# Patient Record
Sex: FEMALE | Race: WHITE | Employment: OTHER | URBAN - METROPOLITAN AREA
[De-identification: names, ages, dates, MRNs, and addresses within clinical notes are randomized per-mention and may not be internally consistent; named-entity substitution may affect disease eponyms.]

---

## 2018-06-19 LAB — MAMMOGRAPHY, EXTERNAL: NORMAL

## 2020-07-29 ENCOUNTER — HOSPITAL ENCOUNTER (OUTPATIENT)
Age: 70
Discharge: HOME | End: 2020-07-31
Payer: MEDICARE

## 2020-07-29 VITALS
WEIGHT: 171 LBS | SYSTOLIC BLOOD PRESSURE: 130 MMHG | RESPIRATION RATE: 14 BRPM | BODY MASS INDEX: 27.6 KG/M2 | DIASTOLIC BLOOD PRESSURE: 78 MMHG | TEMPERATURE: 96.8 F | HEART RATE: 76 BPM

## 2020-07-29 LAB
ALBUMIN SERPL-MCNC: 4.1 G/DL (ref 3.5–5.2)
ALP BLD-CCNC: 55 U/L (ref 35–104)
ALT SERPL-CCNC: 18 U/L (ref 0–32)
ANION GAP SERPL CALCULATED.3IONS-SCNC: 14 MMOL/L (ref 7–16)
AST SERPL-CCNC: 18 U/L (ref 0–31)
BASOPHILS ABSOLUTE: 0.11 E9/L (ref 0–0.2)
BASOPHILS RELATIVE PERCENT: 1.7 % (ref 0–2)
BILIRUB SERPL-MCNC: 0.4 MG/DL (ref 0–1.2)
BUN BLDV-MCNC: 18 MG/DL (ref 8–23)
CALCIUM SERPL-MCNC: 9.4 MG/DL (ref 8.6–10.2)
CHLORIDE BLD-SCNC: 108 MMOL/L (ref 98–107)
CHOLESTEROL, TOTAL: 158 MG/DL (ref 0–199)
CO2: 23 MMOL/L (ref 22–29)
CREAT SERPL-MCNC: 0.7 MG/DL (ref 0.5–1)
EOSINOPHILS ABSOLUTE: 0.2 E9/L (ref 0.05–0.5)
EOSINOPHILS RELATIVE PERCENT: 3.1 % (ref 0–6)
GFR AFRICAN AMERICAN: >60
GFR NON-AFRICAN AMERICAN: >60 ML/MIN/1.73
GLUCOSE BLD-MCNC: 87 MG/DL (ref 74–99)
HCT VFR BLD CALC: 38.8 % (ref 34–48)
HDLC SERPL-MCNC: 67 MG/DL
HEMOGLOBIN: 12.2 G/DL (ref 11.5–15.5)
IMMATURE GRANULOCYTES #: 0.02 E9/L
IMMATURE GRANULOCYTES %: 0.3 % (ref 0–5)
LDL CHOLESTEROL CALCULATED: 78 MG/DL (ref 0–99)
LYMPHOCYTES ABSOLUTE: 1.96 E9/L (ref 1.5–4)
LYMPHOCYTES RELATIVE PERCENT: 30.1 % (ref 20–42)
MCH RBC QN AUTO: 32.5 PG (ref 26–35)
MCHC RBC AUTO-ENTMCNC: 31.4 % (ref 32–34.5)
MCV RBC AUTO: 103.5 FL (ref 80–99.9)
MONOCYTES ABSOLUTE: 0.56 E9/L (ref 0.1–0.95)
MONOCYTES RELATIVE PERCENT: 8.6 % (ref 2–12)
NEUTROPHILS ABSOLUTE: 3.67 E9/L (ref 1.8–7.3)
NEUTROPHILS RELATIVE PERCENT: 56.2 % (ref 43–80)
PDW BLD-RTO: 12.8 FL (ref 11.5–15)
PLATELET # BLD: 269 E9/L (ref 130–450)
PMV BLD AUTO: 10.6 FL (ref 7–12)
POTASSIUM SERPL-SCNC: 4.4 MMOL/L (ref 3.5–5)
RBC # BLD: 3.75 E12/L (ref 3.5–5.5)
SODIUM BLD-SCNC: 145 MMOL/L (ref 132–146)
T3 FREE: 2 PG/ML (ref 2–4.4)
T4 FREE: 1.14 NG/DL (ref 0.93–1.7)
TOTAL PROTEIN: 6.9 G/DL (ref 6.4–8.3)
TRIGL SERPL-MCNC: 67 MG/DL (ref 0–149)
TSH SERPL DL<=0.05 MIU/L-ACNC: 7.44 UIU/ML (ref 0.27–4.2)
VITAMIN D 25-HYDROXY: 64 NG/ML (ref 30–100)
VLDLC SERPL CALC-MCNC: 13 MG/DL
WBC # BLD: 6.5 E9/L (ref 4.5–11.5)

## 2020-07-29 PROCEDURE — 36415 COLL VENOUS BLD VENIPUNCTURE: CPT

## 2020-07-29 PROCEDURE — 80053 COMPREHEN METABOLIC PANEL: CPT

## 2020-07-29 PROCEDURE — 84481 FREE ASSAY (FT-3): CPT

## 2020-07-29 PROCEDURE — 85025 COMPLETE CBC W/AUTO DIFF WBC: CPT

## 2020-07-29 PROCEDURE — 84443 ASSAY THYROID STIM HORMONE: CPT

## 2020-07-29 PROCEDURE — 82306 VITAMIN D 25 HYDROXY: CPT

## 2020-07-29 PROCEDURE — 84439 ASSAY OF FREE THYROXINE: CPT

## 2020-07-29 PROCEDURE — 86800 THYROGLOBULIN ANTIBODY: CPT

## 2020-07-29 PROCEDURE — 80061 LIPID PANEL: CPT

## 2020-07-29 PROCEDURE — 86376 MICROSOMAL ANTIBODY EACH: CPT

## 2020-07-29 RX ORDER — MIRTAZAPINE 15 MG/1
15 TABLET, FILM COATED ORAL NIGHTLY
COMMUNITY
End: 2021-07-30

## 2020-07-29 RX ORDER — PRAVASTATIN SODIUM 20 MG
20 TABLET ORAL DAILY
COMMUNITY
End: 2021-01-29

## 2020-07-29 RX ORDER — LEVOTHYROXINE SODIUM 0.1 MG/1
100 TABLET ORAL DAILY
COMMUNITY

## 2020-07-31 LAB
THYROGLOBULIN AB: >2200 IU/ML (ref 0–4)
THYROID PEROXIDASE (TPO) ABS: 1133 IU/ML (ref 0–9)

## 2020-12-22 RX ORDER — LOSARTAN POTASSIUM 50 MG/1
50 TABLET ORAL DAILY
Qty: 90 TABLET | Refills: 0 | Status: SHIPPED
Start: 2020-12-22 | End: 2021-01-29 | Stop reason: SDUPTHER

## 2021-01-29 ENCOUNTER — OFFICE VISIT (OUTPATIENT)
Dept: FAMILY MEDICINE CLINIC | Age: 71
End: 2021-01-29
Payer: MEDICARE

## 2021-01-29 VITALS
HEART RATE: 73 BPM | SYSTOLIC BLOOD PRESSURE: 128 MMHG | BODY MASS INDEX: 29.16 KG/M2 | WEIGHT: 175 LBS | HEIGHT: 65 IN | OXYGEN SATURATION: 97 % | DIASTOLIC BLOOD PRESSURE: 80 MMHG | TEMPERATURE: 98 F

## 2021-01-29 DIAGNOSIS — F32.A DEPRESSION, UNSPECIFIED DEPRESSION TYPE: ICD-10-CM

## 2021-01-29 DIAGNOSIS — Z00.00 ANNUAL PHYSICAL EXAM: Primary | ICD-10-CM

## 2021-01-29 DIAGNOSIS — R73.9 HYPERGLYCEMIA: ICD-10-CM

## 2021-01-29 DIAGNOSIS — E78.5 HYPERLIPIDEMIA, UNSPECIFIED HYPERLIPIDEMIA TYPE: ICD-10-CM

## 2021-01-29 DIAGNOSIS — K21.9 GASTROESOPHAGEAL REFLUX DISEASE WITHOUT ESOPHAGITIS: ICD-10-CM

## 2021-01-29 DIAGNOSIS — Z85.3 HISTORY OF BREAST CANCER: ICD-10-CM

## 2021-01-29 DIAGNOSIS — F41.1 GENERALIZED ANXIETY DISORDER: ICD-10-CM

## 2021-01-29 DIAGNOSIS — I10 ESSENTIAL HYPERTENSION: ICD-10-CM

## 2021-01-29 DIAGNOSIS — E03.9 HYPOTHYROIDISM, UNSPECIFIED TYPE: ICD-10-CM

## 2021-01-29 LAB
BILIRUBIN, POC: NORMAL
BLOOD URINE, POC: NORMAL
CLARITY, POC: NORMAL
COLOR, POC: NORMAL
GLUCOSE URINE, POC: NORMAL
KETONES, POC: NORMAL
LEUKOCYTE EST, POC: NORMAL
NITRITE, POC: NORMAL
PH, POC: 6
PROTEIN, POC: NORMAL
SPECIFIC GRAVITY, POC: 1
UROBILINOGEN, POC: 0.2

## 2021-01-29 PROCEDURE — 81002 URINALYSIS NONAUTO W/O SCOPE: CPT | Performed by: INTERNAL MEDICINE

## 2021-01-29 PROCEDURE — G0439 PPPS, SUBSEQ VISIT: HCPCS | Performed by: INTERNAL MEDICINE

## 2021-01-29 PROCEDURE — 93000 ELECTROCARDIOGRAM COMPLETE: CPT | Performed by: INTERNAL MEDICINE

## 2021-01-29 PROCEDURE — G8484 FLU IMMUNIZE NO ADMIN: HCPCS | Performed by: INTERNAL MEDICINE

## 2021-01-29 RX ORDER — LOSARTAN POTASSIUM 50 MG/1
50 TABLET ORAL DAILY
Qty: 90 TABLET | Refills: 1 | Status: SHIPPED
Start: 2021-01-29 | End: 2021-07-30 | Stop reason: SDUPTHER

## 2021-01-29 SDOH — ECONOMIC STABILITY: INCOME INSECURITY: HOW HARD IS IT FOR YOU TO PAY FOR THE VERY BASICS LIKE FOOD, HOUSING, MEDICAL CARE, AND HEATING?: NOT ASKED

## 2021-01-29 SDOH — ECONOMIC STABILITY: TRANSPORTATION INSECURITY
IN THE PAST 12 MONTHS, HAS THE LACK OF TRANSPORTATION KEPT YOU FROM MEDICAL APPOINTMENTS OR FROM GETTING MEDICATIONS?: NO

## 2021-01-29 SDOH — ECONOMIC STABILITY: FOOD INSECURITY: WITHIN THE PAST 12 MONTHS, YOU WORRIED THAT YOUR FOOD WOULD RUN OUT BEFORE YOU GOT MONEY TO BUY MORE.: NEVER TRUE

## 2021-01-29 ASSESSMENT — PATIENT HEALTH QUESTIONNAIRE - PHQ9
SUM OF ALL RESPONSES TO PHQ QUESTIONS 1-9: 0
SUM OF ALL RESPONSES TO PHQ QUESTIONS 1-9: 0
SUM OF ALL RESPONSES TO PHQ9 QUESTIONS 1 & 2: 0

## 2021-01-29 NOTE — PROGRESS NOTES
Subjective:     Chief Complaint   Patient presents with    Annual Exam   Patient here for follow-up on hypertension minimal CAD by cardiac cath in 2004 LAD 30 to 40%, stress test 2019 with Dr. Siddiqui Hands negative,  History of abnormal result was on anticoagulation for 6 months seen by Dr. Eustacio Saint has been off the anticoagulation since 2019  Hypothyroidism follows with endocrinologist in [facility] [place] adjusting her thyroid medication  Her stool cards have been done by gynecologist Dr. Lisa Edwards    She had depression and generalized anxiety for long time has done well with the psychiatrist  And feels much more comfortable    History of breast cancer she gets mammogram as per gynecologist Dr. Lisa Edwards  For embolism in 2018 Eliquis she was seen by Dr. Eustacio Saint and Dr. Celis Carrier was stopped as per oncology 6 months  No further events she is physically very active she does the treadmill 4 miles a day every day  She had a CTA chest 2019 which was negative    Past Medical History:   Diagnosis Date    Anxiety     CAD (coronary artery disease)     Cancer ([facility] [address]     Congenital hearing loss     Depression     seeing Dr. Chet Perez. doing better     Hyperlipidemia     Hypertension     Hypothyroidism     Osteopenia     Rosacea     Thyroid disease         Social History     Socioeconomic History    Marital status:      Spouse name: Not on file    Number of children: Not on file    Years of education: Not on file    Highest education level: Not on file   Occupational History    Not on file   Social Needs    Financial resource strain: Not on file    Food insecurity     Worry: Never true     Inability: Never true   [employer] needs     Medical: No     Non-medical: No   Tobacco Use    Smoking status: Never Smoker    Smokeless tobacco: Never Used   Substance and Sexual Activity    Alcohol use:  Yes     Alcohol/week: 14.0 standard drinks     Types: 14 Glasses of wine per week    Drug use: Not on file  Sexual activity: Yes     Partners: Male   Lifestyle    Physical activity     Days per week: Not on file     Minutes per session: Not on file    Stress: Not on file   Relationships    Social connections     Talks on phone: Not on file     Gets together: Not on file     Attends Anabaptist service: Not on file     Active member of club or organization: Not on file     Attends meetings of clubs or organizations: Not on file     Relationship status: Not on file    Intimate partner violence     Fear of current or ex partner: Not on file     Emotionally abused: Not on file     Physically abused: Not on file     Forced sexual activity: Not on file   Other Topics Concern    Not on file   Social History Narrative    Not on file        Past Surgical History:   Procedure Laterality Date    [address] Mastectomy    CARDIAC SURGERY      COLONOSCOPY      ENDOSCOPY, COLON, DIAGNOSTIC      TONSILLECTOMY      TUBAL LIGATION  1982        Family History   Problem Relation Age of Onset    Colon Cancer Mother     Cancer Sister     Cancer Father 80        GI cancer, lip cancer         Allergies   Allergen Reactions    Sulfa Antibiotics      Other reaction(s): Adverse drug reaction, Unknown    Celexa [Citalopram]      GI issues         Lipitor [Atorvastatin Calcium]      Leg pains           ROS  No acute distress  Cardiac: Denies any chest pain or palpitation  She does treadmill 4 miles a day every day without any angina or dyspnea  Respiratory: Denies any cough or shortness of breath  GI: No abdominal pain.  Denies any nausea vomiting or diarrhea  Colonoscopy 2019 by Dr. Marcy Silverman repeat in 24  : Denies any dysuria frequency or hematuria  Neuro: No headache or dizziness  Endocrine: No diabetes history of thyroid disease being followed by endocrinologist in [place]  Skin: normal  No recent weight gain or weight loss  Denies any change in vision    Objective: /80   Pulse 73   Temp 98 °F (36.7 °C)   Ht 5' 5\" (1.651 m)   Wt 175 lb (79.4 kg)   SpO2 97%   BMI 29.12 kg/m²     Constitutional: Alert awake and oriented  Eyes: Pupils equal bilaterally. Extraocular muscles intact  Neck: no JVD adenopathy no bruit  Heart:  RRR, no murmurs, gallops, or rubs. Lungs:    no wheeze, rales or rhonchi  Abd: bowel sounds present, nontender, nondistended, no masses  Extrem:  No clubbing, cyanosis, or edema  Neuro: AAOx3,No Focal deficit  Psychological: Depression anxiety doing much better being followed by psychiatrist every 3 months      Current Outpatient Medications   Medication Sig Dispense Refill    losartan (COZAAR) 50 MG tablet Take 1 tablet by mouth daily 90 tablet 1    levothyroxine (SYNTHROID) 100 MCG tablet Take 100 mcg by mouth Daily       mirtazapine (REMERON) 15 MG tablet Take 15 mg by mouth nightly Take 1/2 at night      Multiple Vitamins-Minerals (THERAPEUTIC MULTIVITAMIN-MINERALS) tablet Take 1 tablet by mouth daily      aspirin 81 MG tablet Take 81 mg by mouth daily      pravastatin (PRAVACHOL) 40 MG tablet Take 40 mg by mouth daily Take alternating with 20mg      omeprazole (PRILOSEC) 20 MG capsule Take 20 mg by mouth daily.  vitamin E 400 UNIT capsule Take 400 Units by mouth daily. No current facility-administered medications for this visit.          Last 3 BMP  Lab Results   Component Value Date/Time     07/29/2020 09:12 AM     04/24/2017 04:15 PM     09/13/2016 12:10 PM    K 4.4 07/29/2020 09:12 AM    K 3.7 04/24/2017 04:15 PM    K 4.0 09/13/2016 12:10 PM     (H) 07/29/2020 09:12 AM     04/24/2017 04:15 PM     09/13/2016 12:10 PM    CO2 23 07/29/2020 09:12 AM    CO2 23 04/24/2017 04:15 PM    CO2 21 (L) 09/13/2016 12:10 PM    BUN 18 07/29/2020 09:12 AM    BUN 15 04/24/2017 04:15 PM    BUN 22 09/13/2016 12:10 PM    CREATININE 0.7 07/29/2020 09:12 AM    CREATININE 0.6 04/24/2017 04:15 PM CREATININE 0.6 09/13/2016 12:10 PM    GLUCOSE 87 07/29/2020 09:12 AM    GLUCOSE 151 (H) 04/24/2017 04:15 PM    GLUCOSE 104 09/13/2016 12:10 PM    GLUCOSE 98 11/09/2011 08:50 AM    GLUCOSE 99 03/07/2011 08:41 AM    GLUCOSE 100 02/23/2011 04:45 PM    CALCIUM 9.4 07/29/2020 09:12 AM    CALCIUM 9.2 04/24/2017 04:15 PM    CALCIUM 9.4 09/13/2016 12:10 PM       Last 3 CMP:    Lab Results   Component Value Date/Time     07/29/2020 09:12 AM     04/24/2017 04:15 PM     09/13/2016 12:10 PM    K 4.4 07/29/2020 09:12 AM    K 3.7 04/24/2017 04:15 PM    K 4.0 09/13/2016 12:10 PM     (H) 07/29/2020 09:12 AM     04/24/2017 04:15 PM     09/13/2016 12:10 PM    CO2 23 07/29/2020 09:12 AM    CO2 23 04/24/2017 04:15 PM    CO2 21 (L) 09/13/2016 12:10 PM    BUN 18 07/29/2020 09:12 AM    BUN 15 04/24/2017 04:15 PM    BUN 22 09/13/2016 12:10 PM    CREATININE 0.7 07/29/2020 09:12 AM    CREATININE 0.6 04/24/2017 04:15 PM    CREATININE 0.6 09/13/2016 12:10 PM    GLUCOSE 87 07/29/2020 09:12 AM    GLUCOSE 151 (H) 04/24/2017 04:15 PM    GLUCOSE 104 09/13/2016 12:10 PM    GLUCOSE 98 11/09/2011 08:50 AM    GLUCOSE 99 03/07/2011 08:41 AM    GLUCOSE 100 02/23/2011 04:45 PM    CALCIUM 9.4 07/29/2020 09:12 AM    CALCIUM 9.2 04/24/2017 04:15 PM    CALCIUM 9.4 09/13/2016 12:10 PM    PROT 6.9 07/29/2020 09:12 AM    PROT 6.8 04/24/2017 04:15 PM    PROT 7.2 04/26/2013 08:45 AM    LABALBU 4.1 07/29/2020 09:12 AM    LABALBU 3.9 04/24/2017 04:15 PM    LABALBU 4.5 04/26/2013 08:45 AM    LABALBU 4.5 11/09/2011 08:50 AM    LABALBU 4.5 03/07/2011 08:41 AM    BILITOT 0.4 07/29/2020 09:12 AM    BILITOT 0.2 04/24/2017 04:15 PM    BILITOT 0.6 04/26/2013 08:45 AM    ALKPHOS 55 07/29/2020 09:12 AM    ALKPHOS 35 04/24/2017 04:15 PM    ALKPHOS 54 04/26/2013 08:45 AM    AST 18 07/29/2020 09:12 AM    AST 16 04/24/2017 04:15 PM    AST 13 04/26/2013 08:45 AM    ALT 18 07/29/2020 09:12 AM    ALT 16 04/24/2017 04:15 PM ALT 22 04/26/2013 08:45 AM        CBC:   Lab Results   Component Value Date/Time    WBC 6.5 07/29/2020 09:12 AM    RBC 3.75 07/29/2020 09:12 AM    HGB 12.2 07/29/2020 09:12 AM    HCT 38.8 07/29/2020 09:12 AM    .5 (H) 07/29/2020 09:12 AM    MCH 32.5 07/29/2020 09:12 AM    MCHC 31.4 (L) 07/29/2020 09:12 AM    RDW 12.8 07/29/2020 09:12 AM     07/29/2020 09:12 AM    MPV 10.6 07/29/2020 09:12 AM       A1C:  No results found for: LABA1C    Lipid panel:  Lab Results   Component Value Date    CHOL 158 07/29/2020    CHOL 196 04/26/2013    CHOL 155 11/09/2011    CHOL 171 03/07/2011    TRIG 67 07/29/2020    TRIG 84 04/26/2013    TRIG 90 11/09/2011    HDL 67 07/29/2020    HDL 68.0 04/26/2013    HDL 56.0 11/09/2011        Lab Results   Component Value Date/Time    PROT 6.9 07/29/2020 09:12 AM    PROT 6.8 04/24/2017 04:15 PM    PROT 7.2 04/26/2013 08:45 AM    INR 1.1 02/23/2011 04:45 PM       Lab Results   Component Value Date/Time    MG 2.1 02/23/2011 04:45 PM         Assessment. Nella was seen today for annual exam.    Diagnoses and all orders for this visit:    Annual physical exam  -     EKG 12 Lead; Future  -     POCT Urinalysis no Micro  -     EKG 12 Lead    Essential hypertension  -     CBC Auto Differential; Future  -     Comprehensive Metabolic Panel; Future    Hypothyroidism, unspecified type    Gastroesophageal reflux disease without esophagitis  -     CBC Auto Differential; Future    Hyperlipidemia, unspecified hyperlipidemia type  -     Comprehensive Metabolic Panel; Future  -     Lipid Panel; Future    Depression, unspecified depression type    Generalized anxiety disorder    History of breast cancer    Hyperglycemia  -     Comprehensive Metabolic Panel; Future  -     Hemoglobin A1C; Future    Other orders  -     losartan (COZAAR) 50 MG tablet; Take 1 tablet by mouth daily       There is no problem list on file for this patient.       Plan: Overall patient is doing very well Asymptomatic no new symptoms    Continue losartan for the blood pressure control    She is taking Synthroid 100 mcg 5 days a week and 150 mcg 2 days a week    She had a thyroid nodule in the past I recommended ultrasound thyroid she will discuss with the endocrinologist in [place]    She gets the stool checked by Dr. Mary Lopez    She sees a physician with a functional medicine in [place] and taking supplements they checked her stool    Continue diet and exercise    EKG shows normal sinus rhythm nonspecific T wave changes  She will follow-up with Dr. Michael Castillo this year  She had minimal CAD LAD 30 to 40% 2004 lifestyle diet modification discussed regular exercise discussed    She gets mammogram as per gynecologist    Repeat blood work before next visit    Return in about 6 months (around 7/29/2021).        Jared Nguyen MD  10:59 AM  1/29/2021     DE

## 2021-02-14 ENCOUNTER — IMMUNIZATION (OUTPATIENT)
Dept: PRIMARY CARE CLINIC | Age: 71
End: 2021-02-14
Payer: MEDICARE

## 2021-02-14 PROCEDURE — 0001A COVID-19, PFIZER VACCINE 30MCG/0.3ML DOSE: CPT | Performed by: PHYSICIAN ASSISTANT

## 2021-02-14 PROCEDURE — 91300 COVID-19, PFIZER VACCINE 30MCG/0.3ML DOSE: CPT | Performed by: PHYSICIAN ASSISTANT

## 2021-03-10 ENCOUNTER — IMMUNIZATION (OUTPATIENT)
Dept: PRIMARY CARE CLINIC | Age: 71
End: 2021-03-10
Payer: MEDICARE

## 2021-03-10 PROCEDURE — 0002A COVID-19, PFIZER VACCINE 30MCG/0.3ML DOSE: CPT | Performed by: NURSE PRACTITIONER

## 2021-03-10 PROCEDURE — 91300 COVID-19, PFIZER VACCINE 30MCG/0.3ML DOSE: CPT | Performed by: NURSE PRACTITIONER

## 2021-05-16 ENCOUNTER — HOSPITAL ENCOUNTER (EMERGENCY)
Age: 71
Discharge: HOME | End: 2021-05-16
Attending: EMERGENCY MEDICINE
Payer: MEDICARE

## 2021-05-16 ENCOUNTER — APPOINTMENT (OUTPATIENT)
Dept: GENERAL RADIOLOGY | Age: 71
End: 2021-05-16
Payer: MEDICARE

## 2021-05-16 VITALS
HEIGHT: 67 IN | DIASTOLIC BLOOD PRESSURE: 82 MMHG | HEART RATE: 67 BPM | RESPIRATION RATE: 16 BRPM | SYSTOLIC BLOOD PRESSURE: 136 MMHG | BODY MASS INDEX: 26.37 KG/M2 | TEMPERATURE: 97.7 F | OXYGEN SATURATION: 98 % | WEIGHT: 168 LBS

## 2021-05-16 DIAGNOSIS — S52.592A OTHER CLOSED FRACTURE OF DISTAL END OF LEFT RADIUS, INITIAL ENCOUNTER: Primary | ICD-10-CM

## 2021-05-16 PROCEDURE — 73100 X-RAY EXAM OF WRIST: CPT

## 2021-05-16 PROCEDURE — 73110 X-RAY EXAM OF WRIST: CPT

## 2021-05-16 PROCEDURE — 99284 EMERGENCY DEPT VISIT MOD MDM: CPT

## 2021-05-16 PROCEDURE — 25605 CLTX DST RDL FX/EPHYS SEP W/: CPT

## 2021-05-16 RX ORDER — ACETAMINOPHEN 500 MG
500 TABLET ORAL 4 TIMES DAILY PRN
Qty: 120 TABLET | Refills: 0 | Status: SHIPPED | OUTPATIENT
Start: 2021-05-16 | End: 2021-07-30

## 2021-05-16 ASSESSMENT — ENCOUNTER SYMPTOMS
CHEST TIGHTNESS: 0
ABDOMINAL PAIN: 0
COUGH: 0
DIARRHEA: 0
VOMITING: 0
NAUSEA: 0
BACK PAIN: 0
SORE THROAT: 0
ABDOMINAL DISTENTION: 0
SHORTNESS OF BREATH: 0

## 2021-05-16 NOTE — ED PROVIDER NOTES
HPI     Patient is a 78 y.o. femalewith a past medical history of [place] artery disease, cancer, depression, hyperlipidemia, hypertension, hypothyroidism, and rosacea who presents with a chief complaint of fall. This has been occurring for few hours. Patient states that it gets better with nothing. Patient states that it gets worse with nothing. Patient states that it is severe in severity. Patient states that she fell while picking up a brick and landed on her left wrist that was outstretched. Patient states that she now has a deformity. Patient denies hitting her head. Patient denies any loss of consciousness. Patient takes blood thinners. Patient states that she is able to move the fingers and has mild pain to left hand. Patient denies any other systemic symptoms or symptoms leading prior to the fall. Patient denies any fevers, chills, nausea, vomiting, or change in urination or bowel habits. Patient is right-handed  Review of Systems   Constitutional: Negative for activity change, appetite change, chills, fatigue and fever. HENT: Negative for congestion, drooling and sore throat. Respiratory: Negative for cough, chest tightness and shortness of breath. Cardiovascular: Negative for chest pain and palpitations. Gastrointestinal: Negative for abdominal distention, abdominal pain, diarrhea, nausea and vomiting. Genitourinary: Negative for decreased urine volume, difficulty urinating, enuresis, flank pain, frequency and hematuria. Musculoskeletal: Positive for joint swelling and myalgias. Negative for arthralgias, back pain and neck stiffness. Skin: Negative for rash and wound. Neurological: Negative for dizziness, facial asymmetry, light-headedness and headaches. Psychiatric/Behavioral: Negative for agitation, confusion and decreased concentration. Physical Exam  Vitals and nursing note reviewed. Constitutional:       General: She is not in acute distress. Appearance: She is well-developed. She is not ill-appearing. HENT:      Head: Normocephalic and atraumatic. Right Ear: External ear normal.      Left Ear: External ear normal.      Nose: Nose normal. No congestion or rhinorrhea. Mouth/Throat:      Mouth: Mucous membranes are moist.      Pharynx: No oropharyngeal exudate or posterior oropharyngeal erythema. Eyes:      Conjunctiva/sclera: Conjunctivae normal.   Cardiovascular:      Rate and Rhythm: Normal rate and regular rhythm. Heart sounds: Normal heart sounds. No murmur heard. Pulmonary:      Effort: Pulmonary effort is normal. No respiratory distress. Breath sounds: Normal breath sounds. No wheezing or rales. Abdominal:      General: Bowel sounds are normal.      Palpations: Abdomen is soft. Tenderness: There is no abdominal tenderness. There is no guarding or rebound. Musculoskeletal:         General: Tenderness, deformity and signs of injury present. No swelling. Cervical back: Normal range of motion and neck supple. Right lower leg: No edema. Comments: Patient has a dinner fork deformity left hand. Patient is able to move all 5 fingers left hand. Patient has good cap refill. Patient sensation intact. Patient's tenderness to palpation. Skin:     General: Skin is warm and dry. Neurological:      Mental Status: She is alert and oriented to person, place, and time. Cranial Nerves: No cranial nerve deficit.       Coordination: Coordination normal.          Splint Application    Date/Time: 5/16/2021 3:24 PM  Performed by: Maria Del Carmen Leary MD  Authorized by: Mariely Soliman DO     Consent:     Consent obtained:  Verbal    Consent given by:  Patient    Risks discussed:  Discoloration, numbness, pain and swelling    Alternatives discussed:  No treatment  Pre-procedure details:     Sensation:  Weakness  Procedure details:     Laterality:  Left    Location:  Wrist    Wrist:  L wrist    Splint type:  Sugar tong    Supplies:  Ortho-Glass  Post-procedure details:     Pain:  Improved    Sensation:  Weakness    Patient tolerance of procedure: Tolerated well, no immediate complications  Ortho Injury    Date/Time: 5/16/2021 3:25 PM  Performed by: Magdalena Parker MD  Authorized by: Linnette Paulino DO   Injury location: wrist  Location details: left wrist  Injury type: fracture  Fracture type: distal radius  Pre-procedure distal perfusion: normal  Pre-procedure neurological function: normal  Pre-procedure range of motion: reduced  Anesthesia: local infiltration    Anesthesia:  Local anesthesia used: yes  Local Anesthetic: lidocaine 1% without epinephrine  Anesthetic total: 5 mL    Sedation:  Patient sedated: no    Manipulation performed: yes  Skin traction used: yes  Skeletal traction used: yes  Reduction successful: yes  X-ray confirmed reduction: yes  Immobilization: sling  Splint type: sugar tong  Supplies used: Ortho-Glass  Post-procedure distal perfusion: normal  Post-procedure neurological function: normal  Post-procedure range of motion: improved           [facility]     ED Course as of May 18 1209   Sun May 16, 2021   1458 Attempted wrist reduction with some mild success. Patient was placed in an sugar tong. [JM]      ED Course User Index  [JM] Magdalena Parker MD      Patient is a 78 y.o. female presenting with wrist pain. Patient had a mechanical fall landing on her outstretched left wrist.  Patient has no pain anywhere else. Patient has obvious wrist deformity. Patient will have x-rays done. X-rays indicated distal radius fracture. Fracture was reduced using a hematoma block. Fracture appears to be improved and discussed case with orthopedic surgery who stated that patient can follow-up tomorrow. Patient was educated these findings. Patient declined pain medication while in the ED. Patient good pulses and good sensation in the left wrist after reduction. Patient was given return precautions.  Patient will follow up with their primary care provider. Patient is agreeable to this plan. Patient has remained stable throughout their stay in the ED. Patient was seen and evaluated by myself and my attending No att. providers found. Assessment and Plan discussed with attending provider, please see attestation for final plan of care. This note was done using dictation software and there may be some grammatical errors associated with this. Nallely Valiente DO       ED Course as of May 18 1209   Sun May 16, 2021   1458 Attempted wrist reduction with some mild success. Patient was placed in an sugar tong. []      ED Course User Index  [] Samy Figueroa MD       --------------------------------------------- PAST HISTORY ---------------------------------------------  Past Medical History:  has a past medical history of Anxiety, CAD (coronary artery disease), Cancer ([facility] [address] Congenital hearing loss, Depression, Hyperlipidemia, Hypertension, Hypothyroidism, Osteopenia, Rosacea, and Thyroid disease. Past Surgical History:  has a past surgical history that includes Cardiac surgery; Breast surgery; Tubal ligation (1982); Tonsillectomy; Colonoscopy; and Endoscopy, colon, diagnostic. Social History:  reports that she has never smoked. She has never used smokeless tobacco. She reports current alcohol use of about 14.0 standard drinks of alcohol per week. Family History: family history includes Cancer in her sister; Cancer (age of onset: 80) in her father; Colon Cancer in her mother. The patients home medications have been reviewed. Allergies: Sulfa antibiotics, Celexa [citalopram], and Lipitor [atorvastatin calcium]    -------------------------------------------------- RESULTS -------------------------------------------------  Labs:  No results found for this visit on 05/16/21. Radiology:  XR WRIST LEFT (2 VIEWS)   Final Result   1. Angulated distal left radius fracture apex volar.   Regional soft tissue   swelling. Post reduction radiographs also obtained which showed improved   overall alignment. 2.  Probable ulnar styloid process fracture. 3.  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 XR WRIST LEFT (MIN 3 VIEWS)   Final Result   1. Angulated distal left radius fracture apex volar. Regional soft tissue   swelling. Post reduction radiographs also obtained which showed improved   overall alignment. 2.  Probable ulnar styloid process fracture. 3.  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             ------------------------- NURSING NOTES AND VITALS REVIEWED ---------------------------  Date / Time Roomed:  5/16/2021  1:23 PM  ED Bed Assignment:  05/05    The nursing notes within the ED encounter and vital signs as below have been reviewed. /82   Pulse 67   Temp 97.7 °F (36.5 °C)   Resp 16   Ht 5' 7\" (1.702 m)   Wt 168 lb (76.2 kg)   SpO2 98%   BMI 26.31 kg/m²   Oxygen Saturation Interpretation: Normal      ------------------------------------------ PROGRESS NOTES ------------------------------------------  3:26 PM EDT  I have spoken with the patient and discussed todays results, in addition to providing specific details for the plan of care and counseling regarding the diagnosis and prognosis. Their questions are answered at this time and they are agreeable with the plan. I discussed at length with them reasons for immediate return here for re evaluation. They will followup with their orthopedic physician by calling their office tomorrow. --------------------------------- ADDITIONAL PROVIDER NOTES ---------------------------------  At this time the patient is without objective evidence of an acute process requiring hospitalization or inpatient management. They have remained hemodynamically stable throughout their entire ED visit and are stable for discharge with outpatient follow-up.      The plan has been discussed in detail and they are aware of the specific conditions for emergent return, as well as the importance of follow-up. Discharge Medication List as of 5/16/2021  3:14 PM      START taking these medications    Details   acetaminophen (TYLENOL) 500 MG tablet Take 1 tablet by mouth 4 times daily as needed for Pain, Disp-120 tablet, R-0Normal             Diagnosis:  1. Other closed fracture of distal end of left radius, initial encounter        Disposition:  Patient's disposition: Discharge to home  Patient's condition is stable. ATTENDING PROVIDER ATTESTATION:     Joe Garcia presented to the emergency department for evaluation of Fall and Wrist Injury (lt wrist deformity)    I have reviewed and discussed the case, including pertinent history (medical, surgical, family and social) and exam findings with the Resident and the Nurse assigned to Joe Garcia. I have personally performed and/or participated in the history, exam, medical decision making, and procedures and agree with all pertinent clinical information. Obvious deformity of left wrist with dorsal angulation. Sensation grossly intact in left hand. Cap refill is brisk. Right radial pulses 2+. No other areas of bony tenderness. No midline cervical spine tenderness. No bony crepitance. I have reviewed my findings and recommendations with Joe Garcia and members of family present at the time of disposition. MDM: Supportive care, will obtain appropriate labs and imaging to assess patient's Fall and Wrist Injury (lt wrist deformity)       My findings/plan: The encounter diagnosis was Other closed fracture of distal end of left radius, initial encounter.   Discharge Medication List as of 5/16/2021  3:14 PM        START taking these medications    Details   acetaminophen (TYLENOL) 500 MG tablet Take 1 tablet by mouth 4 times daily as needed for Pain, Disp-120 tablet, R-0Normal           DO Luis M West Valerie Mercado MD  Resident  05/16/21 [id number]  [id number], DO  05/18/21 0117

## 2021-07-26 DIAGNOSIS — E78.5 HYPERLIPIDEMIA, UNSPECIFIED HYPERLIPIDEMIA TYPE: ICD-10-CM

## 2021-07-26 DIAGNOSIS — R73.9 HYPERGLYCEMIA: ICD-10-CM

## 2021-07-26 DIAGNOSIS — K21.9 GASTROESOPHAGEAL REFLUX DISEASE WITHOUT ESOPHAGITIS: ICD-10-CM

## 2021-07-26 DIAGNOSIS — I10 ESSENTIAL HYPERTENSION: ICD-10-CM

## 2021-07-26 LAB
ALBUMIN SERPL-MCNC: 4 G/DL (ref 3.5–5.2)
ALP BLD-CCNC: 57 U/L (ref 35–104)
ALT SERPL-CCNC: 10 U/L (ref 0–32)
ANION GAP SERPL CALCULATED.3IONS-SCNC: 14 MMOL/L (ref 7–16)
AST SERPL-CCNC: 20 U/L (ref 0–31)
BASOPHILS ABSOLUTE: 0.08 E9/L (ref 0–0.2)
BASOPHILS RELATIVE PERCENT: 1.1 % (ref 0–2)
BILIRUB SERPL-MCNC: 0.3 MG/DL (ref 0–1.2)
BUN BLDV-MCNC: 18 MG/DL (ref 6–23)
CALCIUM SERPL-MCNC: 9.8 MG/DL (ref 8.6–10.2)
CHLORIDE BLD-SCNC: 108 MMOL/L (ref 98–107)
CHOLESTEROL, TOTAL: 178 MG/DL (ref 0–199)
CO2: 22 MMOL/L (ref 22–29)
CREAT SERPL-MCNC: 0.7 MG/DL (ref 0.5–1)
EOSINOPHILS ABSOLUTE: 0.27 E9/L (ref 0.05–0.5)
EOSINOPHILS RELATIVE PERCENT: 3.8 % (ref 0–6)
GFR AFRICAN AMERICAN: >60
GFR NON-AFRICAN AMERICAN: >60 ML/MIN/1.73
GLUCOSE BLD-MCNC: 83 MG/DL (ref 74–99)
HBA1C MFR BLD: 5.3 % (ref 4–5.6)
HCT VFR BLD CALC: 37.9 % (ref 34–48)
HDLC SERPL-MCNC: 66 MG/DL
HEMOGLOBIN: 11.9 G/DL (ref 11.5–15.5)
IMMATURE GRANULOCYTES #: 0.02 E9/L
IMMATURE GRANULOCYTES %: 0.3 % (ref 0–5)
LDL CHOLESTEROL CALCULATED: 100 MG/DL (ref 0–99)
LYMPHOCYTES ABSOLUTE: 2.12 E9/L (ref 1.5–4)
LYMPHOCYTES RELATIVE PERCENT: 29.8 % (ref 20–42)
MCH RBC QN AUTO: 31.8 PG (ref 26–35)
MCHC RBC AUTO-ENTMCNC: 31.4 % (ref 32–34.5)
MCV RBC AUTO: 101.3 FL (ref 80–99.9)
MONOCYTES ABSOLUTE: 0.54 E9/L (ref 0.1–0.95)
MONOCYTES RELATIVE PERCENT: 7.6 % (ref 2–12)
NEUTROPHILS ABSOLUTE: 4.09 E9/L (ref 1.8–7.3)
NEUTROPHILS RELATIVE PERCENT: 57.4 % (ref 43–80)
PDW BLD-RTO: 12.9 FL (ref 11.5–15)
PLATELET # BLD: 289 E9/L (ref 130–450)
PMV BLD AUTO: 10 FL (ref 7–12)
POTASSIUM SERPL-SCNC: 4.6 MMOL/L (ref 3.5–5)
RBC # BLD: 3.74 E12/L (ref 3.5–5.5)
SODIUM BLD-SCNC: 144 MMOL/L (ref 132–146)
TOTAL PROTEIN: 7.1 G/DL (ref 6.4–8.3)
TRIGL SERPL-MCNC: 60 MG/DL (ref 0–149)
VLDLC SERPL CALC-MCNC: 12 MG/DL
WBC # BLD: 7.1 E9/L (ref 4.5–11.5)

## 2021-07-30 ENCOUNTER — OFFICE VISIT (OUTPATIENT)
Dept: FAMILY MEDICINE CLINIC | Age: 71
End: 2021-07-30
Payer: MEDICARE

## 2021-07-30 VITALS
TEMPERATURE: 97 F | WEIGHT: 172 LBS | SYSTOLIC BLOOD PRESSURE: 130 MMHG | DIASTOLIC BLOOD PRESSURE: 80 MMHG | OXYGEN SATURATION: 96 % | HEART RATE: 54 BPM | BODY MASS INDEX: 26.94 KG/M2

## 2021-07-30 DIAGNOSIS — I10 ESSENTIAL HYPERTENSION: Primary | ICD-10-CM

## 2021-07-30 DIAGNOSIS — F41.8 DEPRESSION WITH ANXIETY: ICD-10-CM

## 2021-07-30 DIAGNOSIS — E78.5 HYPERLIPIDEMIA, UNSPECIFIED HYPERLIPIDEMIA TYPE: ICD-10-CM

## 2021-07-30 DIAGNOSIS — I25.10 CORONARY ARTERY DISEASE INVOLVING NATIVE CORONARY ARTERY OF NATIVE HEART WITHOUT ANGINA PECTORIS: ICD-10-CM

## 2021-07-30 DIAGNOSIS — D75.89 MACROCYTOSIS WITHOUT ANEMIA: ICD-10-CM

## 2021-07-30 DIAGNOSIS — Z86.711 HISTORY OF PULMONARY EMBOLISM: ICD-10-CM

## 2021-07-30 DIAGNOSIS — E03.9 HYPOTHYROIDISM, UNSPECIFIED TYPE: ICD-10-CM

## 2021-07-30 PROCEDURE — 3017F COLORECTAL CA SCREEN DOC REV: CPT | Performed by: INTERNAL MEDICINE

## 2021-07-30 PROCEDURE — G8427 DOCREV CUR MEDS BY ELIG CLIN: HCPCS | Performed by: INTERNAL MEDICINE

## 2021-07-30 PROCEDURE — 4040F PNEUMOC VAC/ADMIN/RCVD: CPT | Performed by: INTERNAL MEDICINE

## 2021-07-30 PROCEDURE — 1123F ACP DISCUSS/DSCN MKR DOCD: CPT | Performed by: INTERNAL MEDICINE

## 2021-07-30 PROCEDURE — G8417 CALC BMI ABV UP PARAM F/U: HCPCS | Performed by: INTERNAL MEDICINE

## 2021-07-30 PROCEDURE — 1090F PRES/ABSN URINE INCON ASSESS: CPT | Performed by: INTERNAL MEDICINE

## 2021-07-30 PROCEDURE — 99214 OFFICE O/P EST MOD 30 MIN: CPT | Performed by: INTERNAL MEDICINE

## 2021-07-30 PROCEDURE — G8400 PT W/DXA NO RESULTS DOC: HCPCS | Performed by: INTERNAL MEDICINE

## 2021-07-30 PROCEDURE — 1036F TOBACCO NON-USER: CPT | Performed by: INTERNAL MEDICINE

## 2021-07-30 RX ORDER — PHENOL 1.4 %
1 AEROSOL, SPRAY (ML) MUCOUS MEMBRANE 3 TIMES DAILY PRN
COMMUNITY
End: 2022-03-28

## 2021-07-30 RX ORDER — LOSARTAN POTASSIUM 50 MG/1
50 TABLET ORAL DAILY
Qty: 90 TABLET | Refills: 1 | Status: SHIPPED
Start: 2021-07-30 | End: 2021-12-21 | Stop reason: SDUPTHER

## 2021-07-30 NOTE — PROGRESS NOTES
Subjective:     Chief Complaint   Patient presents with    3 Month Follow-Up   Patient here for follow-up on the hypertension, hyperlipidemia lab reports,  Follow-up on the thyroid,  She does see endocrinologist in [facility],    She recently had a wrist fracture on the left hand after a fall, she was working in the yard she lost her balance and fell she had surgery done in [place] [address]  She is feeling a lot better    She had a mammogram ordered by gynecologist she is going for that      Past Medical History:   Diagnosis Date    Anxiety     CAD (coronary artery disease)     Cancer ([facility] [address]     Congenital hearing loss     Depression     seeing Dr. Chepe Lopez. doing better     Hyperlipidemia     Hypertension     Hypothyroidism     Osteopenia     Rosacea     Thyroid disease         Social History     Socioeconomic History    Marital status:      Spouse name: Not on file    Number of children: Not on file    Years of education: Not on file    Highest education level: Not on file   Occupational History    Not on file   Tobacco Use    Smoking status: Never Smoker    Smokeless tobacco: Never Used   Substance and Sexual Activity    Alcohol use: Yes     Alcohol/week: 14.0 standard drinks     Types: 14 Glasses of wine per week    Drug use: Not on file    Sexual activity: Yes     Partners: Male   Other Topics Concern    Not on file   Social History Narrative    Not on file     Social Determinants of Health     Financial Resource Strain:     Difficulty of Paying Living Expenses:    Food Insecurity: No Food Insecurity    Worried About Running Out of Food in the Last Year: Never true    Geremias of Food in the Last Year: Never true   Transportation Needs: No Transportation Needs    Lack of Transportation (Medical): No    Lack of Transportation (Non-Medical):  No   Physical Activity:     Days of Exercise per Week:     Minutes of Exercise per Session:    Stress:     Feeling of Stress : Social Connections:     Frequency of Communication with Friends and Family:     Frequency of Social Gatherings with Friends and Family:     Attends Latter day Services:     Active Member of Clubs or Organizations:     Attends Club or Organization Meetings:     Marital Status:    Intimate Partner Violence:     Fear of Current or Ex-Partner:     Emotionally Abused:     Physically Abused:     Sexually Abused:         Past Surgical History:   Procedure Laterality Date    BREAST SURGERY      Rt. Mastectomy    CARDIAC SURGERY      COLONOSCOPY      ENDOSCOPY, COLON, DIAGNOSTIC      TONSILLECTOMY      TUBAL LIGATION  1982        Family History   Problem Relation Age of Onset    Colon Cancer Mother     Cancer Sister     Cancer Father 80        GI cancer, lip cancer         Allergies   Allergen Reactions    Sulfa Antibiotics      Other reaction(s): Adverse drug reaction, Unknown    Celexa [Citalopram]      GI issues         Lipitor [Atorvastatin Calcium]      Leg pains           ROS  No acute distress  Cardiac: Denies any chest pain or palpitation  Denies any chest pain palpitation  She does the treadmill at home in the winter without any difficulty  She is very active and walking without any difficulty without any angina    Minimal CAD by cardiac cath in 2004  LAD 30 to 40%, stress test 2019 - with Dr. Leanne Metcalf  Respiratory: Denies any cough or shortness of breath    History of colon embolism in 2018 she was seen by hematologist Dr. Belkis Fofana and pulmonologist Dr. Ryan Arthur  CTA chest 2019 was negative    She has been off Eliquis for long time without any recurrence of symptoms she was seen by oncologist and pulmonologist and they stopped the Eliquis    GI: No abdominal pain.  Denies any nausea vomiting or diarrhea    Colonoscopy 2019 by Dr. Kb Simmons repeat in 2024  : Denies any dysuria frequency or hematuria  Neuro: No headache or dizziness  Endocrine: No diabetes  Hypothyroidism she does see endocrinologist in [facility]  Had a TSH done recently at [facility] which was okay as per patient  Skin: normal  No recent weight gain or weight loss  Denies any change in vision    Objective:    /80   Pulse 54   Temp 97 °F (36.1 °C)   Wt 172 lb (78 kg)   SpO2 96%   BMI 26.94 kg/m²     Constitutional: Alert awake and oriented  Eyes: Pupils equal bilaterally. Extraocular muscles intact  Neck: no JVD adenopathy no bruit  Heart:  RRR, no murmurs, gallops, or rubs. Lungs:    no wheeze, rales or rhonchi  Abd: bowel sounds present, nontender, nondistended, no masses  Extrem:  No clubbing, cyanosis, or edema  Neuro: AAOx3,No Focal deficit  Psychological: History of depression anxiety doing much better she did follow with the psychiatrist, denies any suicidal ideation      Current Outpatient Medications   Medication Sig Dispense Refill    calcium carbonate 600 MG TABS tablet Take 1 tablet by mouth 3 times daily as needed      losartan (COZAAR) 50 MG tablet Take 1 tablet by mouth daily 90 tablet 1    levothyroxine (SYNTHROID) 100 MCG tablet Take 100 mcg by mouth Daily       Multiple Vitamins-Minerals (THERAPEUTIC MULTIVITAMIN-MINERALS) tablet Take 1 tablet by mouth daily      aspirin 81 MG tablet Take 81 mg by mouth daily      pravastatin (PRAVACHOL) 40 MG tablet Take 40 mg by mouth daily Take alternating with 20mg       No current facility-administered medications for this visit.         Last 3 BMP  Lab Results   Component Value Date/Time     07/26/2021 09:09 AM     07/29/2020 09:12 AM     04/24/2017 04:15 PM    K 4.6 07/26/2021 09:09 AM    K 4.4 07/29/2020 09:12 AM    K 3.7 04/24/2017 04:15 PM     (H) 07/26/2021 09:09 AM     (H) 07/29/2020 09:12 AM     04/24/2017 04:15 PM    CO2 22 07/26/2021 09:09 AM    CO2 23 07/29/2020 09:12 AM    CO2 23 04/24/2017 04:15 PM    BUN 18 07/26/2021 09:09 AM    BUN 18 07/29/2020 09:12 AM    BUN 15 04/24/2017 04:15 PM    CREATININE 0.7 07/26/2021 09:09 AM CREATININE 0.7 07/29/2020 09:12 AM    CREATININE 0.6 04/24/2017 04:15 PM    GLUCOSE 83 07/26/2021 09:09 AM    GLUCOSE 87 07/29/2020 09:12 AM    GLUCOSE 151 (H) 04/24/2017 04:15 PM    GLUCOSE 98 11/09/2011 08:50 AM    GLUCOSE 99 03/07/2011 08:41 AM    GLUCOSE 100 02/23/2011 04:45 PM    CALCIUM 9.8 07/26/2021 09:09 AM    CALCIUM 9.4 07/29/2020 09:12 AM    CALCIUM 9.2 04/24/2017 04:15 PM       Last 3 CMP:    Lab Results   Component Value Date/Time     07/26/2021 09:09 AM     07/29/2020 09:12 AM     04/24/2017 04:15 PM    K 4.6 07/26/2021 09:09 AM    K 4.4 07/29/2020 09:12 AM    K 3.7 04/24/2017 04:15 PM     (H) 07/26/2021 09:09 AM     (H) 07/29/2020 09:12 AM     04/24/2017 04:15 PM    CO2 22 07/26/2021 09:09 AM    CO2 23 07/29/2020 09:12 AM    CO2 23 04/24/2017 04:15 PM    BUN 18 07/26/2021 09:09 AM    BUN 18 07/29/2020 09:12 AM    BUN 15 04/24/2017 04:15 PM    CREATININE 0.7 07/26/2021 09:09 AM    CREATININE 0.7 07/29/2020 09:12 AM    CREATININE 0.6 04/24/2017 04:15 PM    GLUCOSE 83 07/26/2021 09:09 AM    GLUCOSE 87 07/29/2020 09:12 AM    GLUCOSE 151 (H) 04/24/2017 04:15 PM    GLUCOSE 98 11/09/2011 08:50 AM    GLUCOSE 99 03/07/2011 08:41 AM    GLUCOSE 100 02/23/2011 04:45 PM    CALCIUM 9.8 07/26/2021 09:09 AM    CALCIUM 9.4 07/29/2020 09:12 AM    CALCIUM 9.2 04/24/2017 04:15 PM    PROT 7.1 07/26/2021 09:09 AM    PROT 6.9 07/29/2020 09:12 AM    PROT 6.8 04/24/2017 04:15 PM    LABALBU 4.0 07/26/2021 09:09 AM    LABALBU 4.1 07/29/2020 09:12 AM    LABALBU 3.9 04/24/2017 04:15 PM    LABALBU 4.5 11/09/2011 08:50 AM    LABALBU 4.5 03/07/2011 08:41 AM    BILITOT 0.3 07/26/2021 09:09 AM    BILITOT 0.4 07/29/2020 09:12 AM    BILITOT 0.2 04/24/2017 04:15 PM    ALKPHOS 57 07/26/2021 09:09 AM    ALKPHOS 55 07/29/2020 09:12 AM    ALKPHOS 35 04/24/2017 04:15 PM    AST 20 07/26/2021 09:09 AM    AST 18 07/29/2020 09:12 AM    AST 16 04/24/2017 04:15 PM    ALT 10 07/26/2021 09:09 AM    ALT 18 07/29/2020 09:12 AM    ALT 16 04/24/2017 04:15 PM        CBC:   Lab Results   Component Value Date/Time    WBC 7.1 07/26/2021 09:09 AM    RBC 3.74 07/26/2021 09:09 AM    HGB 11.9 07/26/2021 09:09 AM    HCT 37.9 07/26/2021 09:09 AM    .3 (H) 07/26/2021 09:09 AM    MCH 31.8 07/26/2021 09:09 AM    MCHC 31.4 (L) 07/26/2021 09:09 AM    RDW 12.9 07/26/2021 09:09 AM     07/26/2021 09:09 AM    MPV 10.0 07/26/2021 09:09 AM       A1C:  Lab Results   Component Value Date/Time    LABA1C 5.3 07/26/2021 09:09 AM       Lipid panel:  Lab Results   Component Value Date    CHOL 178 07/26/2021    CHOL 158 07/29/2020    CHOL 196 04/26/2013    CHOL 155 11/09/2011    CHOL 171 03/07/2011    TRIG 60 07/26/2021    TRIG 67 07/29/2020    TRIG 84 04/26/2013    HDL 66 07/26/2021    HDL 67 07/29/2020    HDL 68.0 04/26/2013        Lab Results   Component Value Date/Time    PROT 7.1 07/26/2021 09:09 AM    PROT 6.9 07/29/2020 09:12 AM    PROT 6.8 04/24/2017 04:15 PM    INR 1.1 02/23/2011 04:45 PM       Lab Results   Component Value Date/Time    MG 2.1 02/23/2011 04:45 PM         Assessment. Nella was seen today for 3 month follow-up. Diagnoses and all orders for this visit:    Essential hypertension    Macrocytosis without anemia  -     CBC WITH AUTO DIFFERENTIAL; Future  -     VITAMIN B12 & FOLATE; Future    Hyperlipidemia, unspecified hyperlipidemia type  -     COMPREHENSIVE METABOLIC PANEL; Future  -     LIPID PANEL; Future    Hypothyroidism, unspecified type  -     TSH; Future  -     T4, FREE; Future  -     THYROID PEROXIDASE ANTIBODY; Future    History of pulmonary embolism    Depression with anxiety    Coronary artery disease involving native coronary artery of native heart without angina pectoris    Other orders  -     losartan (COZAAR) 50 MG tablet;  Take 1 tablet by mouth daily       Patient Active Problem List   Diagnosis    Hypothyroidism    Hyperlipidemia    Essential hypertension    Depression with anxiety    History of pulmonary embolism       Plan: Hypertension good control continue losartan 50 mg refill was given    Hyperlipidemia LDL increased to 100  Increase pravastatin to 40 mg daily she was taking 40 mg 3 times a week and 20 mg 4 times a week    Recheck CMP and lipid profile    Hypothyroidism continue Synthroid 100 mcg 5 days a week and 150 mcg twice a week check TSH and T4      History of pulmonary rhythm asymptomatic has seen hematology and pulmonology in the past no further follow-up recommended unless symptomatic as per patient      Depression anxiety doing much better she is off the Remeron    Postmenopausal get mammogram as recommended    Check CBC, CMP, lipid, TSH, T4, thyroid peroxidase antibody, before next visit    Minimal CAD medical management keep LDL around 70 increase pravastatin to 40    Macrocytosis check B12 before next visit    Return in about 6 months (around 1/30/2022) for Terri Dickson.        Ronnie Ramirez MD  10:10 AM  7/30/2021     DE

## 2021-12-01 RX ORDER — PRAVASTATIN SODIUM 40 MG
40 TABLET ORAL DAILY
Qty: 90 TABLET | Refills: 1 | Status: SHIPPED
Start: 2021-12-01 | End: 2021-12-21 | Stop reason: SDUPTHER

## 2021-12-21 RX ORDER — LOSARTAN POTASSIUM 50 MG/1
50 TABLET ORAL DAILY
Qty: 90 TABLET | Refills: 1 | Status: SHIPPED | OUTPATIENT
Start: 2021-12-21 | End: 2022-07-08 | Stop reason: SDUPTHER

## 2021-12-21 RX ORDER — PRAVASTATIN SODIUM 40 MG
40 TABLET ORAL DAILY
Qty: 90 TABLET | Refills: 1 | Status: SHIPPED | OUTPATIENT
Start: 2021-12-21 | End: 2022-06-02 | Stop reason: SDUPTHER

## 2022-03-09 ENCOUNTER — TELEPHONE (OUTPATIENT)
Dept: FAMILY MEDICINE CLINIC | Age: 72
End: 2022-03-09

## 2022-03-09 NOTE — TELEPHONE ENCOUNTER
----- Message from Jerod Lux sent at 3/9/2022  2:58 PM EST -----  Subject: Message to Provider    QUESTIONS  Information for Provider? pt calling about new pt appt on 3/28, wants to   know if blood work is needed? Appt with Kali Amato MD Please   message through my chart if blood work can be done before the appt,  ---------------------------------------------------------------------------  --------------  [address]  What is the best way for the office to contact you?  OK to leave message on   voicemail, OK to respond with electronic message via [employer] portal (only   for patients who have registered [employer] account)  Preferred Call Back Phone Number? [phone]  ---------------------------------------------------------------------------  --------------  SCRIPT ANSWERS  undefined

## 2022-03-27 SDOH — HEALTH STABILITY: PHYSICAL HEALTH: ON AVERAGE, HOW MANY DAYS PER WEEK DO YOU ENGAGE IN MODERATE TO STRENUOUS EXERCISE (LIKE A BRISK WALK)?: 2 DAYS

## 2022-03-27 SDOH — HEALTH STABILITY: PHYSICAL HEALTH: ON AVERAGE, HOW MANY MINUTES DO YOU ENGAGE IN EXERCISE AT THIS LEVEL?: 10 MIN

## 2022-03-27 ASSESSMENT — SOCIAL DETERMINANTS OF HEALTH (SDOH)
WITHIN THE LAST YEAR, HAVE YOU BEEN KICKED, HIT, SLAPPED, OR OTHERWISE PHYSICALLY HURT BY YOUR PARTNER OR EX-PARTNER?: NO
WITHIN THE LAST YEAR, HAVE YOU BEEN AFRAID OF YOUR PARTNER OR EX-PARTNER?: NO
WITHIN THE LAST YEAR, HAVE YOU BEEN HUMILIATED OR EMOTIONALLY ABUSED IN OTHER WAYS BY YOUR PARTNER OR EX-PARTNER?: NO
WITHIN THE LAST YEAR, HAVE TO BEEN RAPED OR FORCED TO HAVE ANY KIND OF SEXUAL ACTIVITY BY YOUR PARTNER OR EX-PARTNER?: NO

## 2022-03-28 ENCOUNTER — OFFICE VISIT (OUTPATIENT)
Dept: FAMILY MEDICINE CLINIC | Age: 72
End: 2022-03-28
Payer: MEDICARE

## 2022-03-28 VITALS
DIASTOLIC BLOOD PRESSURE: 72 MMHG | HEIGHT: 67 IN | WEIGHT: 184.7 LBS | HEART RATE: 70 BPM | BODY MASS INDEX: 28.99 KG/M2 | SYSTOLIC BLOOD PRESSURE: 120 MMHG | OXYGEN SATURATION: 96 % | TEMPERATURE: 97.5 F

## 2022-03-28 DIAGNOSIS — Z76.89 ESTABLISHING CARE WITH NEW DOCTOR, ENCOUNTER FOR: Primary | ICD-10-CM

## 2022-03-28 DIAGNOSIS — I10 ESSENTIAL HYPERTENSION: ICD-10-CM

## 2022-03-28 DIAGNOSIS — Z11.59 NEED FOR HEPATITIS C SCREENING TEST: ICD-10-CM

## 2022-03-28 DIAGNOSIS — Z85.3 HISTORY OF LEFT BREAST CANCER: ICD-10-CM

## 2022-03-28 DIAGNOSIS — Z85.3 HISTORY OF RIGHT BREAST CANCER: ICD-10-CM

## 2022-03-28 DIAGNOSIS — E03.9 HYPOTHYROIDISM, UNSPECIFIED TYPE: ICD-10-CM

## 2022-03-28 DIAGNOSIS — Z86.711 HISTORY OF PULMONARY EMBOLISM: ICD-10-CM

## 2022-03-28 DIAGNOSIS — E78.5 HYPERLIPIDEMIA, UNSPECIFIED HYPERLIPIDEMIA TYPE: ICD-10-CM

## 2022-03-28 PROCEDURE — G8400 PT W/DXA NO RESULTS DOC: HCPCS | Performed by: INTERNAL MEDICINE

## 2022-03-28 PROCEDURE — 1123F ACP DISCUSS/DSCN MKR DOCD: CPT | Performed by: INTERNAL MEDICINE

## 2022-03-28 PROCEDURE — 3017F COLORECTAL CA SCREEN DOC REV: CPT | Performed by: INTERNAL MEDICINE

## 2022-03-28 PROCEDURE — 1036F TOBACCO NON-USER: CPT | Performed by: INTERNAL MEDICINE

## 2022-03-28 PROCEDURE — G8417 CALC BMI ABV UP PARAM F/U: HCPCS | Performed by: INTERNAL MEDICINE

## 2022-03-28 PROCEDURE — G8484 FLU IMMUNIZE NO ADMIN: HCPCS | Performed by: INTERNAL MEDICINE

## 2022-03-28 PROCEDURE — 99214 OFFICE O/P EST MOD 30 MIN: CPT | Performed by: INTERNAL MEDICINE

## 2022-03-28 PROCEDURE — 1090F PRES/ABSN URINE INCON ASSESS: CPT | Performed by: INTERNAL MEDICINE

## 2022-03-28 PROCEDURE — 4040F PNEUMOC VAC/ADMIN/RCVD: CPT | Performed by: INTERNAL MEDICINE

## 2022-03-28 PROCEDURE — G8427 DOCREV CUR MEDS BY ELIG CLIN: HCPCS | Performed by: INTERNAL MEDICINE

## 2022-03-28 RX ORDER — SAME BUTANEDISULFONATE/BETAINE 400-600 MG
POWDER IN PACKET (EA) ORAL
COMMUNITY

## 2022-03-28 RX ORDER — ANASTROZOLE 1 MG/1
1 TABLET ORAL DAILY
COMMUNITY
Start: 2022-03-09

## 2022-03-28 RX ORDER — MAGNESIUM GLYCINATE 100 MG
665 CAPSULE ORAL DAILY
COMMUNITY

## 2022-03-28 RX ORDER — AMOXICILLIN 500 MG/1
500 CAPSULE ORAL 3 TIMES DAILY
COMMUNITY
Start: 2022-03-14 | End: 2022-07-06

## 2022-03-28 SDOH — ECONOMIC STABILITY: FOOD INSECURITY: WITHIN THE PAST 12 MONTHS, YOU WORRIED THAT YOUR FOOD WOULD RUN OUT BEFORE YOU GOT MONEY TO BUY MORE.: NEVER TRUE

## 2022-03-28 SDOH — ECONOMIC STABILITY: FOOD INSECURITY: WITHIN THE PAST 12 MONTHS, THE FOOD YOU BOUGHT JUST DIDN'T LAST AND YOU DIDN'T HAVE MONEY TO GET MORE.: NEVER TRUE

## 2022-03-28 ASSESSMENT — ENCOUNTER SYMPTOMS
ABDOMINAL PAIN: 0
SHORTNESS OF BREATH: 0
NAUSEA: 0
EYE PAIN: 0
SINUS PAIN: 0
BLOOD IN STOOL: 0
SORE THROAT: 0
EYE DISCHARGE: 0

## 2022-03-28 ASSESSMENT — PATIENT HEALTH QUESTIONNAIRE - PHQ9
SUM OF ALL RESPONSES TO PHQ QUESTIONS 1-9: 3
SUM OF ALL RESPONSES TO PHQ9 QUESTIONS 1 & 2: 0
1. LITTLE INTEREST OR PLEASURE IN DOING THINGS: 0
3. TROUBLE FALLING OR STAYING ASLEEP: 0
7. TROUBLE CONCENTRATING ON THINGS, SUCH AS READING THE NEWSPAPER OR WATCHING TELEVISION: 0
SUM OF ALL RESPONSES TO PHQ QUESTIONS 1-9: 3
8. MOVING OR SPEAKING SO SLOWLY THAT OTHER PEOPLE COULD HAVE NOTICED. OR THE OPPOSITE, BEING SO FIGETY OR RESTLESS THAT YOU HAVE BEEN MOVING AROUND A LOT MORE THAN USUAL: 0
SUM OF ALL RESPONSES TO PHQ QUESTIONS 1-9: 3
2. FEELING DOWN, DEPRESSED OR HOPELESS: 0
10. IF YOU CHECKED OFF ANY PROBLEMS, HOW DIFFICULT HAVE THESE PROBLEMS MADE IT FOR YOU TO DO YOUR WORK, TAKE CARE OF THINGS AT HOME, OR GET ALONG WITH OTHER PEOPLE: 1
9. THOUGHTS THAT YOU WOULD BE BETTER OFF DEAD, OR OF HURTING YOURSELF: 0
5. POOR APPETITE OR OVEREATING: 0
SUM OF ALL RESPONSES TO PHQ QUESTIONS 1-9: 3
4. FEELING TIRED OR HAVING LITTLE ENERGY: 3
6. FEELING BAD ABOUT YOURSELF - OR THAT YOU ARE A FAILURE OR HAVE LET YOURSELF OR YOUR FAMILY DOWN: 0

## 2022-03-28 ASSESSMENT — SOCIAL DETERMINANTS OF HEALTH (SDOH): HOW HARD IS IT FOR YOU TO PAY FOR THE VERY BASICS LIKE FOOD, HOUSING, MEDICAL CARE, AND HEATING?: NOT HARD AT ALL

## 2022-03-28 NOTE — PROGRESS NOTES
Chief Complaint   Patient presents with   [facility].     Previous PCP Dr. Adonay Pierre, Specialist: Dr. Barry Mancilla (GI), Dr. Diana Evans (cardio), Dr. Ankur Caro (Radiation onocology), Dr. Smiley Guzman (Gen SX), Dr. Norwood (Oncology), Dr. Katie Armstrong (Internal) Dr. Mirela Bradford (Endo)            Hypertension    Health Maintenance     pt due for AWV/ DEXA scan / Shingles vaccine     Other     would like to discuss Xanax prescription Adonay Burroughs gave her if needed        HPI:  Patient is here as new patient to me    Dr Radha العراقي retired    Has h/o Ca breast rt and left ( 2013 and Oct 2021 - [facility])    H/O PE in 2018 - Eliquis for 6 months    Following with [facility]- for Oncology and endocrine    Has Anxiety- occ took Xanax from Dr Antonia Allen and Medications are reviewed and updated. Past Medical History, Surgical History, and Family History has been reviewed and updated. Review of Systems:  Review of Systems   Constitutional: Negative for chills and fever. HENT: Negative for congestion, sinus pain and sore throat. Eyes: Negative for pain and discharge. Respiratory: Negative for shortness of breath (No new SOb). Cardiovascular: Negative for chest pain. Gastrointestinal: Negative for abdominal pain, blood in stool and nausea. Genitourinary: Negative for flank pain and frequency. Musculoskeletal: Negative for neck pain. Hematological: Does not bruise/bleed easily. Psychiatric/Behavioral: Negative for suicidal ideas. Vitals:    03/28/22 1059   BP: 120/72   Position: Sitting   Pulse: 70   Temp: 97.5 °F (36.4 °C)   TempSrc: Temporal   SpO2: 96%   Weight: 184 lb 11.2 oz (83.8 kg)   Height: 5' 7\" (1.702 m)       Physical Exam  Vitals reviewed. Constitutional:       Appearance: She is well-developed. HENT:      Head: Normocephalic and atraumatic. Mouth/Throat:      Pharynx: No oropharyngeal exudate.    Eyes:      Conjunctiva/sclera: Conjunctivae normal.      Pupils: Pupils are equal, round, and reactive to light. Neck:      Vascular: No JVD. Cardiovascular:      Rate and Rhythm: Normal rate and regular rhythm. Pulmonary:      Effort: Pulmonary effort is normal.      Breath sounds: Normal breath sounds. No rales. Abdominal:      General: Bowel sounds are normal.      Palpations: Abdomen is soft. Musculoskeletal:         General: Normal range of motion. Lymphadenopathy:      Cervical: No cervical adenopathy. Skin:     General: Skin is warm and dry. Neurological:      Mental Status: She is alert and oriented to person, place, and time. Psychiatric:         Behavior: Behavior normal.          Labs :    Lab Results   Component Value Date    WBC 7.1 07/26/2021    HGB 11.9 07/26/2021    HCT 37.9 07/26/2021     07/26/2021    CHOL 178 07/26/2021    TRIG 60 07/26/2021    HDL 66 07/26/2021    ALT 10 07/26/2021    AST 20 07/26/2021     07/26/2021    K 4.6 07/26/2021     (H) 07/26/2021    CREATININE 0.7 07/26/2021    BUN 18 07/26/2021    CO2 22 07/26/2021    TSH 7.440 (H) 07/29/2020    INR 1.1 02/23/2011    LABA1C 5.3 07/26/2021     Lab Results   Component Value Date    COLORU Yellow 03/17/2016    NITRU Negative 03/17/2016    GLUCOSEU NEG 01/29/2021    GLUCOSEU Negative 03/17/2016    KETUA NEG 01/29/2021    KETUA Negative 03/17/2016    UROBILINOGEN 0.2 03/17/2016    BILIRUBINUR NEG 01/29/2021             ASSESSMENT     Patient Active Problem List    Diagnosis Date Noted    Establishing care with new doctor, encounter for 03/28/2022    Hypothyroidism 07/30/2021    Hyperlipidemia 07/30/2021    Essential hypertension 07/30/2021    Depression with anxiety 07/30/2021    History of pulmonary embolism 07/30/2021        Diagnosis:     ICD-10-CM    1. Establishing care with new doctor, encounter for  Z76.89    2. Essential hypertension  I10 CBC with Auto Differential     Comprehensive Metabolic Panel, Fasting     Lipid, Fasting     Urinalysis with Microscopic   3.  Hyperlipidemia, unspecified hyperlipidemia type  E78.5 Comprehensive Metabolic Panel, Fasting     Lipid, Fasting   4. Hypothyroidism, unspecified type  E03.9 TSH   5. History of pulmonary embolism  Z86.711    6. History of left breast cancer  Z85.3     S/p Lumpectomy, XRT abd Chemo --[facility] Oct 2021   7. History of right breast cancer  Z85.3     2013,    8. Need for hepatitis C screening test  Z11.59 Hepatitis C Antibody       PLAN:      Previous labs and Dr Senait Cavanaugh note - reviewed    Recently finished her Chemo- Feb 2022    Feel fatigue but slowly improving    Advise to have ( Fasting) lab test prior to next visit. AWV - next visit    Pt is stable on current medical treatment. Continue current treatment plan    Side effects/Adverse effects/Precautions are reviewed with patient. Low salt, Low Carb diet an low fat diet  Continue medications as advised and take them regularly  Regular exercises as advised    Discussed natural and expected course of this diagnosis and need to alert me if symptoms do not follow expected course, or if any worse. Smoking cessation if applicable, discussed with patient. Patient Instructions   The medication list included in this document is our record of what you are currently taking, including any changes that were made at today's visit.  If you find any differences when compared to your medications at home, or have any questions that were not answered at your visit, please contact the office. Advise to have ( Fasting) lab test prior to next visit.          Return in about 2 months (around 5/28/2022) for Annual Medicare Wellness Exam.

## 2022-06-02 ENCOUNTER — PATIENT MESSAGE (OUTPATIENT)
Dept: FAMILY MEDICINE CLINIC | Age: 72
End: 2022-06-02

## 2022-06-02 DIAGNOSIS — E78.5 HYPERLIPIDEMIA, UNSPECIFIED HYPERLIPIDEMIA TYPE: Primary | ICD-10-CM

## 2022-06-02 RX ORDER — PRAVASTATIN SODIUM 40 MG
40 TABLET ORAL DAILY
Qty: 90 TABLET | Refills: 0 | Status: SHIPPED
Start: 2022-06-02 | End: 2022-09-08 | Stop reason: SDUPTHER

## 2022-06-02 NOTE — TELEPHONE ENCOUNTER
From: Nella White  To: Dr. Jerzy Gutierrez: 6/2/2022 1:08 PM EDT  Subject: Refill subscription for Pravastatin 40 mg    I do not have any refills left on my Pravastatin 40 mg. Can you please send a new prescription to [facility] on [address]? [phone].     Thank you,  Ihsan García  [phone] cell

## 2022-07-05 SDOH — HEALTH STABILITY: PHYSICAL HEALTH: ON AVERAGE, HOW MANY MINUTES DO YOU ENGAGE IN EXERCISE AT THIS LEVEL?: 30 MIN

## 2022-07-05 SDOH — HEALTH STABILITY: PHYSICAL HEALTH: ON AVERAGE, HOW MANY DAYS PER WEEK DO YOU ENGAGE IN MODERATE TO STRENUOUS EXERCISE (LIKE A BRISK WALK)?: 5 DAYS

## 2022-07-05 ASSESSMENT — LIFESTYLE VARIABLES
HOW OFTEN DO YOU HAVE A DRINK CONTAINING ALCOHOL: 1
HOW OFTEN DO YOU HAVE A DRINK CONTAINING ALCOHOL: NEVER

## 2022-07-05 ASSESSMENT — PATIENT HEALTH QUESTIONNAIRE - PHQ9
SUM OF ALL RESPONSES TO PHQ9 QUESTIONS 1 & 2: 0
9. THOUGHTS THAT YOU WOULD BE BETTER OFF DEAD, OR OF HURTING YOURSELF: 0
4. FEELING TIRED OR HAVING LITTLE ENERGY: 0
6. FEELING BAD ABOUT YOURSELF - OR THAT YOU ARE A FAILURE OR HAVE LET YOURSELF OR YOUR FAMILY DOWN: 0
SUM OF ALL RESPONSES TO PHQ QUESTIONS 1-9: 0
5. POOR APPETITE OR OVEREATING: 0
7. TROUBLE CONCENTRATING ON THINGS, SUCH AS READING THE NEWSPAPER OR WATCHING TELEVISION: 0
2. FEELING DOWN, DEPRESSED OR HOPELESS: 0
1. LITTLE INTEREST OR PLEASURE IN DOING THINGS: 0
10. IF YOU CHECKED OFF ANY PROBLEMS, HOW DIFFICULT HAVE THESE PROBLEMS MADE IT FOR YOU TO DO YOUR WORK, TAKE CARE OF THINGS AT HOME, OR GET ALONG WITH OTHER PEOPLE: 0
3. TROUBLE FALLING OR STAYING ASLEEP: 0
8. MOVING OR SPEAKING SO SLOWLY THAT OTHER PEOPLE COULD HAVE NOTICED. OR THE OPPOSITE, BEING SO FIGETY OR RESTLESS THAT YOU HAVE BEEN MOVING AROUND A LOT MORE THAN USUAL: 0
SUM OF ALL RESPONSES TO PHQ QUESTIONS 1-9: 0

## 2022-07-06 ENCOUNTER — OFFICE VISIT (OUTPATIENT)
Dept: FAMILY MEDICINE CLINIC | Age: 72
End: 2022-07-06
Payer: MEDICARE

## 2022-07-06 VITALS
SYSTOLIC BLOOD PRESSURE: 122 MMHG | TEMPERATURE: 97.1 F | BODY MASS INDEX: 27.89 KG/M2 | HEIGHT: 67 IN | WEIGHT: 177.7 LBS | HEART RATE: 67 BPM | DIASTOLIC BLOOD PRESSURE: 70 MMHG | OXYGEN SATURATION: 97 %

## 2022-07-06 DIAGNOSIS — Z11.59 NEED FOR HEPATITIS C SCREENING TEST: ICD-10-CM

## 2022-07-06 DIAGNOSIS — Z00.00 MEDICARE ANNUAL WELLNESS VISIT, SUBSEQUENT: Primary | ICD-10-CM

## 2022-07-06 DIAGNOSIS — E03.9 HYPOTHYROIDISM, UNSPECIFIED TYPE: ICD-10-CM

## 2022-07-06 DIAGNOSIS — I10 ESSENTIAL HYPERTENSION: ICD-10-CM

## 2022-07-06 DIAGNOSIS — E78.5 HYPERLIPIDEMIA, UNSPECIFIED HYPERLIPIDEMIA TYPE: ICD-10-CM

## 2022-07-06 LAB
ALBUMIN SERPL-MCNC: 4 G/DL (ref 3.5–5.2)
ALP BLD-CCNC: 59 U/L (ref 35–104)
ALT SERPL-CCNC: 11 U/L (ref 0–32)
ANION GAP SERPL CALCULATED.3IONS-SCNC: 13 MMOL/L (ref 7–16)
AST SERPL-CCNC: 20 U/L (ref 0–31)
BACTERIA: NORMAL /HPF
BASOPHILS ABSOLUTE: 0.1 E9/L (ref 0–0.2)
BASOPHILS RELATIVE PERCENT: 1.9 % (ref 0–2)
BILIRUB SERPL-MCNC: 0.5 MG/DL (ref 0–1.2)
BILIRUBIN URINE: NEGATIVE
BLOOD, URINE: NEGATIVE
BUN BLDV-MCNC: 12 MG/DL (ref 6–23)
CALCIUM SERPL-MCNC: 9.4 MG/DL (ref 8.6–10.2)
CHLORIDE BLD-SCNC: 107 MMOL/L (ref 98–107)
CHOLESTEROL, FASTING: 173 MG/DL (ref 0–199)
CLARITY: CLEAR
CO2: 22 MMOL/L (ref 22–29)
COLOR: YELLOW
CREAT SERPL-MCNC: 0.7 MG/DL (ref 0.5–1)
EOSINOPHILS ABSOLUTE: 0.23 E9/L (ref 0.05–0.5)
EOSINOPHILS RELATIVE PERCENT: 4.4 % (ref 0–6)
EPITHELIAL CELLS, UA: NORMAL /HPF
GFR AFRICAN AMERICAN: >60
GFR NON-AFRICAN AMERICAN: >60 ML/MIN/1.73
GLUCOSE FASTING: 86 MG/DL (ref 74–99)
GLUCOSE URINE: NEGATIVE MG/DL
HCT VFR BLD CALC: 34.7 % (ref 34–48)
HDLC SERPL-MCNC: 72 MG/DL
HEMOGLOBIN: 11 G/DL (ref 11.5–15.5)
IMMATURE GRANULOCYTES #: 0 E9/L
IMMATURE GRANULOCYTES %: 0 % (ref 0–5)
KETONES, URINE: NEGATIVE MG/DL
LDL CHOLESTEROL CALCULATED: 90 MG/DL (ref 0–99)
LEUKOCYTE ESTERASE, URINE: NEGATIVE
LYMPHOCYTES ABSOLUTE: 1.31 E9/L (ref 1.5–4)
LYMPHOCYTES RELATIVE PERCENT: 25 % (ref 20–42)
MCH RBC QN AUTO: 31.6 PG (ref 26–35)
MCHC RBC AUTO-ENTMCNC: 31.7 % (ref 32–34.5)
MCV RBC AUTO: 99.7 FL (ref 80–99.9)
MONOCYTES ABSOLUTE: 0.49 E9/L (ref 0.1–0.95)
MONOCYTES RELATIVE PERCENT: 9.4 % (ref 2–12)
NEUTROPHILS ABSOLUTE: 3.11 E9/L (ref 1.8–7.3)
NEUTROPHILS RELATIVE PERCENT: 59.3 % (ref 43–80)
NITRITE, URINE: NEGATIVE
PDW BLD-RTO: 13.4 FL (ref 11.5–15)
PH UA: 5.5 (ref 5–9)
PLATELET # BLD: 283 E9/L (ref 130–450)
PMV BLD AUTO: 9.9 FL (ref 7–12)
POTASSIUM SERPL-SCNC: 4.3 MMOL/L (ref 3.5–5)
PROTEIN UA: NEGATIVE MG/DL
RBC # BLD: 3.48 E12/L (ref 3.5–5.5)
RBC UA: NORMAL /HPF (ref 0–2)
SODIUM BLD-SCNC: 142 MMOL/L (ref 132–146)
SPECIFIC GRAVITY UA: 1.02 (ref 1–1.03)
TOTAL PROTEIN: 7.2 G/DL (ref 6.4–8.3)
TRIGLYCERIDE, FASTING: 56 MG/DL (ref 0–149)
TSH SERPL DL<=0.05 MIU/L-ACNC: 2.08 UIU/ML (ref 0.27–4.2)
UROBILINOGEN, URINE: 0.2 E.U./DL
VLDLC SERPL CALC-MCNC: 11 MG/DL
WBC # BLD: 5.2 E9/L (ref 4.5–11.5)
WBC UA: NORMAL /HPF (ref 0–5)

## 2022-07-06 PROCEDURE — 1123F ACP DISCUSS/DSCN MKR DOCD: CPT | Performed by: INTERNAL MEDICINE

## 2022-07-06 PROCEDURE — G0439 PPPS, SUBSEQ VISIT: HCPCS | Performed by: INTERNAL MEDICINE

## 2022-07-06 PROCEDURE — 3017F COLORECTAL CA SCREEN DOC REV: CPT | Performed by: INTERNAL MEDICINE

## 2022-07-06 RX ORDER — CHLORAL HYDRATE 500 MG
1000 CAPSULE ORAL 2 TIMES DAILY
COMMUNITY

## 2022-07-06 NOTE — PROGRESS NOTES
Medicare Annual Wellness Visit    Russ Coulter is here for Medicare AWV (Pt had her Labs done today results not back yet )    Assessment & Plan   Medicare annual wellness visit, subsequent      Recommendations for Preventive Services Due: see orders and patient instructions/AVS.  Recommended screening schedule for the next 5-10 years is provided to the patient in written form: see Patient Instructions/AVS.     Return in 1 year (on 7/6/2023) for Medicare Annual Wellness Visit in 1 year. Subjective       Patient's complete Health Risk Assessment and screening values have been reviewed and are found in Flowsheets. The following problems were reviewed today and where indicated follow up appointments were made and/or referrals ordered.     Positive Risk Factor Screenings with Interventions:               General Health and ACP:  General  In general, how would you say your health is?: Very Good  In the past 7 days, have you experienced any of the following: New or Increased Pain, New or Increased Fatigue, Loneliness, Social Isolation, Stress or Anger?: No  Do you get the social and emotional support that you need?: Yes  Do you have a Living Will?: (!) No    Advance Directives     Power of  Living Will ACP-Advance Directive ACP-Power of     Not on File Not on File Not on File Not on File      General Health Risk Interventions:  · No Living Will: Advance Care Planning addressed with patient today      Safety:  Do you have working smoke detectors?: Yes  Do you have any tripping hazards - loose or unsecured carpets or rugs?: No  Do you have any tripping hazards - clutter in doorways, halls, or stairs?: No  Do you have either shower bars, grab bars, non-slip mats or non-slip surfaces in your shower or bathtub?: (!) No  Do all of your stairways have a railing or banister?: Yes  Do you always fasten your seatbelt when you are in a car?: Yes    Safety Interventions:  · Home safety tips provided Objective   Vitals:    07/06/22 1410   BP: 122/70   Position: Sitting   Pulse: 67   Temp: 97.1 °F (36.2 °C)   TempSrc: Temporal   SpO2: 97%   Weight: 177 lb 11.2 oz (80.6 kg)   Height: 5' 7\" (1.702 m)      Body mass index is 27.83 kg/m². Allergies   Allergen Reactions    Atorvastatin      Other reaction(s): SEVERE MUSCLE (LEG) CRAMPS    Sulfa Antibiotics      Other reaction(s): Adverse drug reaction, Unknown  Other reaction(s): URINARY REACTION AS TEENAGER    Celexa [Citalopram]      GI issues         Lipitor [Atorvastatin Calcium]      Leg pains        Prior to Visit Medications    Medication Sig Taking?  Authorizing Provider   Omega-3 Fatty Acids (FISH OIL) 1000 MG CAPS Take 1,000 mg by mouth in the morning and at bedtime Yes Historical Provider, MD   pravastatin (PRAVACHOL) 40 MG tablet Take 1 tablet by mouth daily Yes Estiven Nichols MD   zinc 50 MG CAPS Take 50 mg by mouth daily Yes Historical Provider, MD   Probiotic Product (PROBIOMAX DAILY DF) CAPS  Yes Historical Provider, MD   anastrozole (ARIMIDEX) 1 MG tablet Take 1 mg by mouth daily Yes Historical Provider, MD   Bioflavonoid Products (VITAMIN C PLUS) 1000 MG TABS  Yes Historical Provider, MD   Cholecalciferol (VITAMIN D3) 25 MCG (1000 UT) CAPS  Yes Historical Provider, MD   Magnesium Glycinate 665 MG CAPS Take 665 mg by mouth daily Yes Historical Provider, MD   losartan (COZAAR) 50 MG tablet Take 1 tablet by mouth daily Yes Sumit Chavez MD   levothyroxine (SYNTHROID) 100 MCG tablet Take 100 mcg by mouth Daily Sunday and Wednesday taking 150mcg, then 100mcg the rest of the days Yes Historical Provider, MD   Multiple Vitamins-Minerals (THERAPEUTIC MULTIVITAMIN-MINERALS) tablet Take 1 tablet by mouth daily Yes Historical Provider, MD   aspirin 81 MG tablet Take 81 mg by mouth daily Yes Historical Provider, MD Saab (Including outside providers/suppliers regularly involved in providing care):   Patient Care Team:  Shun Donaldson MD as PCP - General (Internal Medicine)  Shun Donaldson MD as PCP - [facility] Empaneled Provider     Reviewed and updated this visit:  Allergies  Meds  Problems

## 2022-07-06 NOTE — PATIENT INSTRUCTIONS
Learning About How to Make a Home Safe  Making the home safe for a person in your care: Overview  You can help protect the person in your care by making the home safe. Here aresome general tips for how to lower the chance of getting injured in the home.  Pad sharp corners on furniture and counter tops.  Keep objects that are used often within easy reach.  Install handrails around the toilet and in the shower. Use a tub mat to prevent slipping.  Use a shower chair or bath bench when the person bathes.  Provide good lighting inside and outside the home. Put night-lights in bedrooms, hallways, and bathrooms. Have light at the top and bottom of stairways.  Have a first aid kit. It is also important to be aware of safe temperatures in the home. When helping someone bathe, use the back of your hand to test the water to make sure it's not too hot. Lower the temperature setting in the hot water heater to 120°F or lower to avoid burns. And make sure other liquids (such as coffee, tea, orsoup) are not too hot. How can you protect from fire and carbon monoxide? Home safety alarms are very important. A smoke alarm can detect small amounts of smoke. This can allow time to escape from a fire. And a carbon monoxide alarm can let people know about this deadly gas before it starts to make themsick.  Put smoke alarms:  ? On each level of the home, in the hallway outside sleeping areas, and inside each bedroom. ? In the center of a ceiling, or on a wall 6 to 12 inches from the ceiling. This is where smoke goes first. Avoid places near doors, windows, or air ducts.  Put a carbon monoxide alarm in the hallway outside of the bedrooms in each sleeping area of the house. The alarm should be placed high on the wall. Make sure that the alarm can't be covered up by furniture or drapes.  Test alarms regularly by pressing the test button.  Replace non-lithium batteries in alarms twice a year.    Have a plan for getting out of the home if there is a fire. Practice by having a fire drill.  Keep a fire extinguisher in the kitchen. What can you do to prevent falls? You can help prevent falls by keeping rooms uncluttered, with clear walkways around furniture. Keep electrical cords off the floor, and remove throw rugs toprevent tripping. If there are steps in the home, make sure they all have handrails, and always use the handrails. Don't leave items on the steps, and be sure to fix any thatare loose, broken, or uneven. How can you increase safety for people with dementia? If you are caring for someone who has dementia, you may need to make some extra changes to create a safe home. People with dementia have a loss of mental skills, such as memory, problem solving, and learning. So things that might nothave been a danger to them before can cause safety problems now. Here are some things to consider:   Don't move furniture around. The person may become confused.  Use locks on doors and cupboards. Lock up knives, scissors, medicines, cleaning supplies, and other dangerous items.  Use hidden switches or controls for the stove, thermostat, water heater, and other appliances.  If your loved one is still cooking, think about whether that is safe. It may be okay with some help, depending on your loved one's condition. But for people who have memory or thinking problems, it's best to avoid any activities that might not be safe.  If the person tends to wander or to try to leave the home, install motion-sensor lights on all doors and windows.  Have emergency numbers in a central area near a phone. Include 911 and numbers for the doctor and family members.  Get medical alert jewelry for the person so you can be contacted if he or she wanders away. If possible, provide a safe place for wandering, such as an enclosed yard or garden. Where can you learn more? Go to https://jigna.healthTimeLynes. org and sign in to your [employer] account. Enter E304 in the [facility] box to learn more about \"Learning About How to Make a Home Safe. \"     If you do not have an account, please click on the \"Sign Up Now\" link. Current as of: October 18, 2021               Content Version: 13.3  © 3463-5267 Healthwise, [employer]. Care instructions adapted under license by [facility] ([facility]). If you have questions about a medical condition or this instruction, always ask your healthcare professional. [place] [address] any warranty or liability for your use of this information. Personalized Preventive Plan for Nella White - 7/6/2022  Medicare offers a range of preventive health benefits. Some of the tests and screenings are paid in full while other may be subject to a deductible, co-insurance, and/or copay. Some of these benefits include a comprehensive review of your medical history including lifestyle, illnesses that may run in your family, and various assessments and screenings as appropriate. After reviewing your medical record and screening and assessments performed today your provider may have ordered immunizations, labs, imaging, and/or referrals for you. A list of these orders (if applicable) as well as your Preventive Care list are included within your After Visit Summary for your review. Other Preventive Recommendations:    · A preventive eye exam performed by an eye specialist is recommended every 1-2 years to screen for glaucoma; cataracts, macular degeneration, and other eye disorders. · A preventive dental visit is recommended every 6 months. · Try to get at least 150 minutes of exercise per week or 10,000 steps per day on a pedometer . · Order or download the FREE \"Exercise & Physical Activity: Your Everyday Guide\" from [employer]. Call 7-790.251.2085 or search [employer] online.   · You need 2648-4449 mg of calcium and 7729-8698 IU of vitamin D per day. It is possible to meet your calcium requirement with diet alone, but a vitamin D supplement is usually necessary to meet this goal.  · When exposed to the sun, use a sunscreen that protects against both UVA and UVB radiation with an SPF of 30 or greater. Reapply every 2 to 3 hours or after sweating, drying off with a towel, or swimming. · Always wear a seat belt when traveling in a car. Always wear a helmet when riding a bicycle or motorcycle.

## 2022-07-07 ENCOUNTER — PATIENT MESSAGE (OUTPATIENT)
Dept: FAMILY MEDICINE CLINIC | Age: 72
End: 2022-07-07

## 2022-07-07 LAB — HEPATITIS C ANTIBODY INTERPRETATION: NORMAL

## 2022-07-08 RX ORDER — LOSARTAN POTASSIUM 50 MG/1
50 TABLET ORAL DAILY
Qty: 90 TABLET | Refills: 1 | Status: SHIPPED
Start: 2022-07-08 | End: 2022-10-06 | Stop reason: SDUPTHER

## 2022-07-08 NOTE — TELEPHONE ENCOUNTER
From: Nella White  To: Dr. Rodney Ray: 7/7/2022 5:11 PM EDT  Subject: Losartan 50 mg    I am in need of a prescription for this medication. please forward it to [facility] in [facility]. Thank you.

## 2022-09-08 ENCOUNTER — PATIENT MESSAGE (OUTPATIENT)
Dept: FAMILY MEDICINE CLINIC | Age: 72
End: 2022-09-08

## 2022-09-08 DIAGNOSIS — E78.5 HYPERLIPIDEMIA, UNSPECIFIED HYPERLIPIDEMIA TYPE: ICD-10-CM

## 2022-09-08 RX ORDER — PRAVASTATIN SODIUM 40 MG
40 TABLET ORAL DAILY
Qty: 90 TABLET | Refills: 0 | Status: SHIPPED
Start: 2022-09-08 | End: 2022-10-06 | Stop reason: SDUPTHER

## 2022-09-08 NOTE — TELEPHONE ENCOUNTER
From: Nella White  To: Dr. Harley Epps: 2022 9:15 AM EDT  Subject: Prescription Request    Dr. Maxim Roth,    I need a new prescription for my Pravastatin 40 mg. Please send to [facility] in [facility] - [phone]. Thank you.

## 2022-10-06 ENCOUNTER — OFFICE VISIT (OUTPATIENT)
Dept: FAMILY MEDICINE CLINIC | Age: 72
End: 2022-10-06
Payer: MEDICARE

## 2022-10-06 VITALS
HEIGHT: 67 IN | WEIGHT: 178.2 LBS | BODY MASS INDEX: 27.97 KG/M2 | TEMPERATURE: 97.2 F | DIASTOLIC BLOOD PRESSURE: 64 MMHG | OXYGEN SATURATION: 95 % | HEART RATE: 58 BPM | SYSTOLIC BLOOD PRESSURE: 118 MMHG

## 2022-10-06 DIAGNOSIS — Z85.3 HISTORY OF RIGHT BREAST CANCER: ICD-10-CM

## 2022-10-06 DIAGNOSIS — Z85.3 HISTORY OF LEFT BREAST CANCER: ICD-10-CM

## 2022-10-06 DIAGNOSIS — E03.9 HYPOTHYROIDISM, UNSPECIFIED TYPE: ICD-10-CM

## 2022-10-06 DIAGNOSIS — Z86.711 HISTORY OF PULMONARY EMBOLISM: ICD-10-CM

## 2022-10-06 DIAGNOSIS — I10 ESSENTIAL HYPERTENSION: Primary | ICD-10-CM

## 2022-10-06 DIAGNOSIS — E78.5 HYPERLIPIDEMIA, UNSPECIFIED HYPERLIPIDEMIA TYPE: ICD-10-CM

## 2022-10-06 PROCEDURE — G8400 PT W/DXA NO RESULTS DOC: HCPCS | Performed by: INTERNAL MEDICINE

## 2022-10-06 PROCEDURE — 99214 OFFICE O/P EST MOD 30 MIN: CPT | Performed by: INTERNAL MEDICINE

## 2022-10-06 PROCEDURE — 1123F ACP DISCUSS/DSCN MKR DOCD: CPT | Performed by: INTERNAL MEDICINE

## 2022-10-06 PROCEDURE — 1090F PRES/ABSN URINE INCON ASSESS: CPT | Performed by: INTERNAL MEDICINE

## 2022-10-06 PROCEDURE — G8484 FLU IMMUNIZE NO ADMIN: HCPCS | Performed by: INTERNAL MEDICINE

## 2022-10-06 PROCEDURE — 1036F TOBACCO NON-USER: CPT | Performed by: INTERNAL MEDICINE

## 2022-10-06 PROCEDURE — 3017F COLORECTAL CA SCREEN DOC REV: CPT | Performed by: INTERNAL MEDICINE

## 2022-10-06 PROCEDURE — G8417 CALC BMI ABV UP PARAM F/U: HCPCS | Performed by: INTERNAL MEDICINE

## 2022-10-06 PROCEDURE — G8427 DOCREV CUR MEDS BY ELIG CLIN: HCPCS | Performed by: INTERNAL MEDICINE

## 2022-10-06 RX ORDER — PRAVASTATIN SODIUM 40 MG
40 TABLET ORAL DAILY
Qty: 90 TABLET | Refills: 1 | Status: SHIPPED | OUTPATIENT
Start: 2022-10-06

## 2022-10-06 RX ORDER — LOSARTAN POTASSIUM 50 MG/1
50 TABLET ORAL DAILY
Qty: 90 TABLET | Refills: 1 | Status: SHIPPED | OUTPATIENT
Start: 2022-10-06

## 2022-10-06 ASSESSMENT — ENCOUNTER SYMPTOMS
EYE PAIN: 0
SHORTNESS OF BREATH: 0
BLOOD IN STOOL: 0
SORE THROAT: 0
ABDOMINAL PAIN: 0
EYE DISCHARGE: 0
NAUSEA: 0
SINUS PAIN: 0

## 2022-10-06 NOTE — PATIENT INSTRUCTIONS
The medication list included in this document is our record of what you are currently taking, including any changes that were made at today's visit. If you find any differences when compared to your medications at home, or have any questions that were not answered at your visit, please contact the office. Advise to have ( Fasting) lab test prior to next visit.
temporal

## 2022-10-06 NOTE — PROGRESS NOTES
Chief Complaint   Patient presents with    Hypertension     Here for a 3 month follow for Hypertension, pt reports feeling good     Medication Refill    Health Maintenance     Shingles vaccine, Flu Vaccine  - pt will get a little later, Pt had annual eye exam with Dr. Ivon Costello everything was good            HPI:  Patient is here for follow-up     Feeling well    No complaints    Needs RFs         Allergy and Medications are reviewed and updated. Past Medical History, Surgical History, and Family History has been reviewed and updated. Review of Systems:  Review of Systems   Constitutional:  Negative for chills and fever. HENT:  Negative for congestion, sinus pain and sore throat. Eyes:  Negative for pain and discharge. Respiratory:  Negative for shortness of breath (No new SOb). Cardiovascular:  Negative for chest pain. Gastrointestinal:  Negative for abdominal pain, blood in stool and nausea. Genitourinary:  Negative for flank pain and frequency. Musculoskeletal:  Negative for neck pain. Hematological:  Does not bruise/bleed easily. Psychiatric/Behavioral:  Negative for suicidal ideas. Vitals:    10/06/22 1004   BP: 118/64   Position: Sitting   Pulse: 58   Temp: 97.2 °F (36.2 °C)   TempSrc: Temporal   SpO2: 95%   Weight: 178 lb 3.2 oz (80.8 kg)   Height: 5' 7\" (1.702 m)       Physical Exam  Vitals reviewed. Constitutional:       Appearance: She is well-developed. HENT:      Head: Normocephalic and atraumatic. Nose: Nose normal.   Eyes:      Conjunctiva/sclera: Conjunctivae normal.      Pupils: Pupils are equal, round, and reactive to light. Neck:      Vascular: No JVD. Cardiovascular:      Rate and Rhythm: Normal rate and regular rhythm. Pulmonary:      Effort: Pulmonary effort is normal.      Breath sounds: Normal breath sounds. Abdominal:      General: Bowel sounds are normal.      Palpations: Abdomen is soft.    Musculoskeletal:         General: Normal range of motion. Skin:     General: Skin is warm and dry. Neurological:      Mental Status: She is alert and oriented to person, place, and time. Psychiatric:         Behavior: Behavior normal.        Labs :    Lab Results   Component Value Date    WBC 5.2 07/06/2022    HGB 11.0 (L) 07/06/2022    HCT 34.7 07/06/2022     07/06/2022    CHOL 178 07/26/2021    TRIG 60 07/26/2021    HDL 72 07/06/2022    ALT 11 07/06/2022    AST 20 07/06/2022     07/06/2022    K 4.3 07/06/2022     07/06/2022    CREATININE 0.7 07/06/2022    BUN 12 07/06/2022    CO2 22 07/06/2022    TSH 2.080 07/06/2022    INR 1.1 02/23/2011    GLUF 86 07/06/2022    LABA1C 5.3 07/26/2021     Lab Results   Component Value Date/Time    COLORU Yellow 07/06/2022 08:24 AM    NITRU Negative 07/06/2022 08:24 AM    GLUCOSEU Negative 07/06/2022 08:24 AM    KETUA Negative 07/06/2022 08:24 AM    UROBILINOGEN 0.2 07/06/2022 08:24 AM    BILIRUBINUR Negative 07/06/2022 08:24 AM    BILIRUBINUR NEG 01/29/2021 10:33 AM             ASSESSMENT     Patient Active Problem List    Diagnosis Date Noted    History of left breast cancer 10/06/2022     Priority: Medium     Overview Note:      S/p Lumpectomy, XRT abd Chemo --[facility] Oct 2021          History of right breast cancer- 2013  10/06/2022     Priority: Medium    Establishing care with new doctor, encounter for 03/28/2022    Hypothyroidism 07/30/2021    Hyperlipidemia 07/30/2021    Essential hypertension 07/30/2021    Depression with anxiety 07/30/2021    History of pulmonary embolism 07/30/2021        Diagnosis:   1. Essential hypertension  -     losartan (COZAAR) 50 MG tablet; Take 1 tablet by mouth daily, Disp-90 tablet, R-1Normal  -     Comprehensive Metabolic Panel, Fasting; Future  -     Lipid, Fasting; Future  -     CBC with Auto Differential; Future  2. Hyperlipidemia, unspecified hyperlipidemia type  -     pravastatin (PRAVACHOL) 40 MG tablet;  Take 1 tablet by mouth daily, Disp-90 tablet, R-1Normal  - Comprehensive Metabolic Panel, Fasting; Future  -     Lipid, Fasting; Future  3. Hypothyroidism, unspecified type  4. History of pulmonary embolism  5. History of left breast cancer  Comments:   S/p Lumpectomy, XRT abd Chemo --[facility] Oct 2021      6. History of right breast cancer- 2013          PLAN:     Pt is stable on current medical treatment. Continue current treatment plan    Side effects/Adverse effects/Precautions are reviewed with patient. Low salt, Low Carb diet an low fat diet  Continue medications as advised and take them regularly  Regular exercises as advised    Discussed natural and expected course of this diagnosis and need to alert me if symptoms do not follow expected course, or if any worse. Smoking cessation if applicable, discussed with patient. Advise to have ( Fasting) lab test prior to next visit. RFs are sent     [place] be [place] for [place]  Will be back in March        There are no Patient Instructions on file for this visit. Return in about 5 months (around 3/6/2023).

## 2022-12-01 ENCOUNTER — PATIENT MESSAGE (OUTPATIENT)
Dept: FAMILY MEDICINE CLINIC | Age: 72
End: 2022-12-01

## 2022-12-01 NOTE — TELEPHONE ENCOUNTER
From: Nella White  To: Dr. Rody Vigil: 12/1/2022 1:49 PM EST  Subject: Pravastatin 40 MG    I need a new prescription for this medication. Please send to [facility] [phone]. Thank you.

## 2022-12-13 ENCOUNTER — TELEPHONE (OUTPATIENT)
Dept: FAMILY MEDICINE CLINIC | Age: 72
End: 2022-12-13

## 2022-12-13 NOTE — TELEPHONE ENCOUNTER
Care Everywhere audit shows patient had a mammogram done 07-27-22, 10-12-21. [email or web address] has been updated.

## 2023-01-09 RX ORDER — DEXTROMETHORPHAN HYDROBROMIDE AND PROMETHAZINE HYDROCHLORIDE 15; 6.25 MG/5ML; MG/5ML
5 SYRUP ORAL 4 TIMES DAILY PRN
Qty: 180 ML | Refills: 0 | Status: SHIPPED | OUTPATIENT
Start: 2023-01-09

## 2023-01-11 ENCOUNTER — HOSPITAL ENCOUNTER (EMERGENCY)
Age: 73
Discharge: HOME | End: 2023-01-11
Payer: MEDICARE

## 2023-01-11 VITALS
SYSTOLIC BLOOD PRESSURE: 121 MMHG | DIASTOLIC BLOOD PRESSURE: 69 MMHG | HEART RATE: 78 BPM | TEMPERATURE: 98.7 F | OXYGEN SATURATION: 95 % | RESPIRATION RATE: 18 BRPM

## 2023-01-11 DIAGNOSIS — J01.90 ACUTE SINUSITIS, RECURRENCE NOT SPECIFIED, UNSPECIFIED LOCATION: Primary | ICD-10-CM

## 2023-01-11 PROCEDURE — 99211 OFF/OP EST MAY X REQ PHY/QHP: CPT

## 2023-01-11 RX ORDER — AMOXICILLIN AND CLAVULANATE POTASSIUM 875; 125 MG/1; MG/1
1 TABLET, FILM COATED ORAL 2 TIMES DAILY
Qty: 20 TABLET | Refills: 0 | Status: SHIPPED | OUTPATIENT
Start: 2023-01-11 | End: 2023-01-21

## 2023-01-11 RX ORDER — METHYLPREDNISOLONE 4 MG/1
TABLET ORAL
Qty: 21 TABLET | Refills: 0 | Status: SHIPPED | OUTPATIENT
Start: 2023-01-11

## 2023-01-11 ASSESSMENT — PAIN SCALES - GENERAL: PAINLEVEL_OUTOF10: 0

## 2023-01-11 ASSESSMENT — PAIN - FUNCTIONAL ASSESSMENT: PAIN_FUNCTIONAL_ASSESSMENT: 0-10

## 2023-01-11 NOTE — ED PROVIDER NOTES
[address] ENCOUNTER        Pt Name: Faisal Hoffmann  MRN: [id number]  [place] 1950  Date of evaluation: 1/11/2023  Provider: KIERRA Li - RHODA  PCP: Simi Lara MD  Note Started: 9:24 AM EST 1/11/23    CHIEF COMPLAINT       Chief Complaint   Patient presents with    Cough     Tested self for covid   thurs  and Monday      congestion  not eating         HISTORY OF PRESENT ILLNESS: 1 or more Elements   History From: patient    Limitations to history : None    Faisal Hoffmann is a 81 y.o. female who presents complaints of sinus congestion pressure over her forehead and a lot of postnasal drainage and a cough. Is been going on for over a week now. She said her doctor called her in some cough medicine but she said she still having all this postnasal drainage and cannot really sleep because of the cough. Tested herself for COVID and it was negative she is denying any chest pain or shortness of breath. She is denying any sore throat, does not have any ear pain. Nursing Notes were all reviewed and agreed with or any disagreements were addressed in the HPI. REVIEW OF SYSTEMS :      Review of Systems    Positives and Pertinent negatives as per HPI. SURGICAL HISTORY     Past Surgical History:   Procedure Laterality Date    BREAST SURGERY      Rt. Mastectomy    CARDIAC SURGERY      COLONOSCOPY      ENDOSCOPY, COLON, DIAGNOSTIC      FRACTURE SURGERY Left     wrist fracture     TONSILLECTOMY      TUBAL LIGATION  1982       CURRENTMEDICATIONS       Previous Medications    ANASTROZOLE (ARIMIDEX) 1 MG TABLET    Take 1 mg by mouth daily    ASPIRIN 81 MG TABLET    Take 81 mg by mouth daily    BIOFLAVONOID PRODUCTS (VITAMIN C PLUS) 1000 MG TABS        CHOLECALCIFEROL (VITAMIN D3) 25 MCG (1000 UT) CAPS        LEVOTHYROXINE (SYNTHROID) 100 MCG TABLET    Take 100 mcg by mouth Daily Sunday and Wednesday taking 150mcg, then 100mcg the rest of the days LOSARTAN (COZAAR) 50 MG TABLET    Take 1 tablet by mouth daily    MAGNESIUM GLYCINATE 665 MG CAPS    Take 665 mg by mouth daily    MULTIPLE VITAMINS-MINERALS (THERAPEUTIC MULTIVITAMIN-MINERALS) TABLET    Take 1 tablet by mouth daily    OMEGA-3 FATTY ACIDS (FISH OIL) 1000 MG CAPS    Take 1,000 mg by mouth in the morning and at bedtime    PRAVASTATIN (PRAVACHOL) 40 MG TABLET    Take 1 tablet by mouth daily    PROBIOTIC PRODUCT (PROBIOMAX DAILY DF) CAPS        PROMETHAZINE-DEXTROMETHORPHAN (PROMETHAZINE-DM) 6.25-15 MG/5ML SYRUP    Take 5 mLs by mouth 4 times daily as needed for Cough    ZINC 50 MG CAPS    Take 50 mg by mouth daily       ALLERGIES     Atorvastatin, Sulfa antibiotics, Celexa [citalopram], and Lipitor [atorvastatin calcium]    FAMILYHISTORY       Family History   Problem Relation Age of Onset    Colon Cancer Mother     Colon Cancer Sister     Cancer Sister     Cancer Father 80        GI cancer, lip cancer     Colon Cancer Brother         SOCIAL HISTORY       Social History     Tobacco Use    Smoking status: Never    Smokeless tobacco: Never   Vaping Use    Vaping Use: Never used   Substance Use Topics    Alcohol use: Never     Alcohol/week: 14.0 standard drinks     Types: 14 Glasses of wine per week    Drug use: Never       SCREENINGS                         CIWA Assessment  BP: 121/69  Heart Rate: 78           PHYSICAL EXAM  1 or more Elements     ED Triage Vitals [01/11/23 0907]   BP Temp Temp src Heart Rate Resp SpO2 Height Weight   121/69 98.7 °F (37.1 °C) -- 78 18 95 % -- --       Physical Exam        Constitutional/General: Alert and oriented x3  Head: Normocephalic and atraumatic  Eyes: clear   ENT:  Oropharynx clear, handling secretions, no trismus, no asymmetry of the posterior oropharynx or uvular edema, TMs normal, no tenderness over the maxillary or frontal sinuses to palpation. Neck: Supple, full ROM,   Respiratory: Lungs clear to auscultation bilaterally, no wheezes, rales, or rhonchi. Not in respiratory distress  Cardiovascular:  Regular rate. Regular rhythm. Musculoskeletal: Moves all extremities x 4. Integument: skin warm and dry. No rashes. Neurologic: GCS 15, no focal deficits,   Psychiatric: Normal Affect            DIAGNOSTIC RESULTS   LABS:    Labs Reviewed - No data to display    As interpreted by me, the above displayed labs are abnormal. All other labs obtained during this visit were within normal range or not returned as of this dictation. Interpretation per the Radiologist below, if available at the time of this note:    No orders to display     No results found. No results found. PROCEDURES   Unless otherwise noted below, none     Procedures      PAST MEDICAL HISTORY/Chronic Conditions Affecting Care      has a past medical history of Anxiety, Breast cancer ([facility] [address] (03/01/2013), Breast cancer ([facility] (09/01/2021), CAD (coronary artery disease), Congenital hearing loss, Depression, Hyperlipidemia, Hypertension, Hypothyroidism, Osteopenia, Rosacea, and Thyroid disease. EMERGENCY DEPARTMENT COURSE    Vitals:    Vitals:    01/11/23 0907   BP: 121/69   Pulse: 78   Resp: 18   Temp: 98.7 °F (37.1 °C)   SpO2: 95%       Patient was given the following medications:  Medications - No data to display                Medical Decision Making/Differential Diagnosis:    CC/HPI Summary, Social Determinants of health, Records Reviewed, DDx, testing done/not done, ED Course, Reassessment, disposition considerations/shared decision making with patient, consults, disposition:        She has been sick for over a week with postnasal drainage-- that  is her main complaint and a cough she has sinus congestion does not have chest pain or shortness of breath so I do not suspect pneumonia on her. She does not have a fever. She tested herself for COVID and it was negative, since she does not have a fever or body aches I do not really suspect influenza on her.   This is been going on for over a week now so I did treat her for sinusitis I did put her on Augmentin, and also a Medrol Dosepak. Advised her she can continue the cough medicine that her doctor prescribed and if no improvement or worsening she should get reevaluated      CONSULTS: (Who and What was discussed)  None        I am the Primary Clinician of Record. FINAL IMPRESSION      1.  Acute sinusitis, recurrence not specified, unspecified location          DISPOSITION/PLAN     DISPOSITION Decision To Discharge 01/11/2023 09:21:21 AM      PATIENT REFERRED TO:  Jennie Horton MD  [address] [place] [address] [phone]    Schedule an appointment as soon as possible for a visit         DISCHARGE MEDICATIONS:  New Prescriptions    AMOXICILLIN-CLAVULANATE (AUGMENTIN) 875-125 MG PER TABLET    Take 1 tablet by mouth 2 times daily for 10 days    METHYLPREDNISOLONE (MEDROL, ABENA,) 4 MG TABLET    Take as directed on package insert days 1-6       DISCONTINUED MEDICATIONS:  Discontinued Medications    No medications on file              (Please note that portions of this note were completed with a voice recognition program.  Efforts were made to edit the dictations but occasionally words are mis-transcribed.)    KIERRA Swartz CNP (electronically signed)     KIERRA Layton CNP  01/11/23 3016

## 2023-01-25 ENCOUNTER — TELEMEDICINE (OUTPATIENT)
Dept: FAMILY MEDICINE CLINIC | Age: 73
End: 2023-01-25
Payer: MEDICARE

## 2023-01-25 DIAGNOSIS — J01.90 ACUTE NON-RECURRENT SINUSITIS, UNSPECIFIED LOCATION: Primary | ICD-10-CM

## 2023-01-25 DIAGNOSIS — E78.5 HYPERLIPIDEMIA, UNSPECIFIED HYPERLIPIDEMIA TYPE: ICD-10-CM

## 2023-01-25 PROCEDURE — 1123F ACP DISCUSS/DSCN MKR DOCD: CPT | Performed by: INTERNAL MEDICINE

## 2023-01-25 PROCEDURE — 99213 OFFICE O/P EST LOW 20 MIN: CPT | Performed by: INTERNAL MEDICINE

## 2023-01-25 PROCEDURE — 3017F COLORECTAL CA SCREEN DOC REV: CPT | Performed by: INTERNAL MEDICINE

## 2023-01-25 PROCEDURE — G8427 DOCREV CUR MEDS BY ELIG CLIN: HCPCS | Performed by: INTERNAL MEDICINE

## 2023-01-25 PROCEDURE — 1090F PRES/ABSN URINE INCON ASSESS: CPT | Performed by: INTERNAL MEDICINE

## 2023-01-25 PROCEDURE — G8400 PT W/DXA NO RESULTS DOC: HCPCS | Performed by: INTERNAL MEDICINE

## 2023-01-25 RX ORDER — AZITHROMYCIN 250 MG/1
TABLET, FILM COATED ORAL
Qty: 1 PACKET | Refills: 0 | Status: SHIPPED | OUTPATIENT
Start: 2023-01-25

## 2023-01-25 RX ORDER — BENZONATATE 200 MG/1
200 CAPSULE ORAL 3 TIMES DAILY PRN
Qty: 30 CAPSULE | Refills: 0 | Status: SHIPPED | OUTPATIENT
Start: 2023-01-25

## 2023-01-25 ASSESSMENT — PATIENT HEALTH QUESTIONNAIRE - PHQ9
SUM OF ALL RESPONSES TO PHQ QUESTIONS 1-9: 5
SUM OF ALL RESPONSES TO PHQ9 QUESTIONS 1 & 2: 0
5. POOR APPETITE OR OVEREATING: 1
6. FEELING BAD ABOUT YOURSELF - OR THAT YOU ARE A FAILURE OR HAVE LET YOURSELF OR YOUR FAMILY DOWN: 0
9. THOUGHTS THAT YOU WOULD BE BETTER OFF DEAD, OR OF HURTING YOURSELF: 0
4. FEELING TIRED OR HAVING LITTLE ENERGY: 3
SUM OF ALL RESPONSES TO PHQ QUESTIONS 1-9: 5
SUM OF ALL RESPONSES TO PHQ QUESTIONS 1-9: 5
10. IF YOU CHECKED OFF ANY PROBLEMS, HOW DIFFICULT HAVE THESE PROBLEMS MADE IT FOR YOU TO DO YOUR WORK, TAKE CARE OF THINGS AT HOME, OR GET ALONG WITH OTHER PEOPLE: 0
8. MOVING OR SPEAKING SO SLOWLY THAT OTHER PEOPLE COULD HAVE NOTICED. OR THE OPPOSITE, BEING SO FIGETY OR RESTLESS THAT YOU HAVE BEEN MOVING AROUND A LOT MORE THAN USUAL: 0
1. LITTLE INTEREST OR PLEASURE IN DOING THINGS: 0
2. FEELING DOWN, DEPRESSED OR HOPELESS: 0
7. TROUBLE CONCENTRATING ON THINGS, SUCH AS READING THE NEWSPAPER OR WATCHING TELEVISION: 1
SUM OF ALL RESPONSES TO PHQ QUESTIONS 1-9: 5
3. TROUBLE FALLING OR STAYING ASLEEP: 0

## 2023-01-25 ASSESSMENT — ENCOUNTER SYMPTOMS
SHORTNESS OF BREATH: 0
SINUS PAIN: 1
NAUSEA: 0
SORE THROAT: 0
EYE PAIN: 0
ABDOMINAL PAIN: 0
BLOOD IN STOOL: 0
EYE DISCHARGE: 0
COUGH: 1

## 2023-01-25 NOTE — PROGRESS NOTES
TeleMedicine Video Visit    Nella Banks, was evaluated through a synchronous (real-time) audio-video encounter. The patient (or guardian if applicable) is aware that this is a billable service. , which includes applicable co-pays. This virtual visit was conducted with the patient's  (and/or legal guardian's) consent. The visit was conducted pursuant to the emergency declaration under the [address] [place] [address], [address] [place] [address] authority and the [employer] Act. Patient identification was verified, and a caregiver was present when appropriate. The patient was located in a state where the provider was licensed to provide care. Patient identification was verified at the start of the visit, including the patient's telephone number and physical location. I discussed with the patient the nature of our telehealth visits, that:     Due to the nature of an audio- video modality, the only components of a physical exam that could be done are the elements supported by direct observation. I would evaluate the patient and recommend diagnostics and treatments based on my assessment. If it was felt that the patient should be evaluated in clinic or an emergency room setting, then they would be directed there. Our sessions are not being recorded and that personal health information is protected. Our team would provide follow up care in person if/when the patient needs it. Patient's location: home address in [facility]  Physician  location  [facility]  other people involved in call  -    Chief Complaint   Patient presents with    Cough     C/o Cough and Congestion x 3 weeks. Seen in Urgent care on 01/11/2023.  Tested negative 2 x for Covid       HPI:  Patient is Video visit    Pt has cough, sinus drainage for over 2 weeks  Was seen at  , treated with Amoxil ,prednisone and Cough syrup  Note- reviewed    Pt still has sinus drainage and cough   No fever or chills     No dyspnea      Allergy and Medications are reviewed and updated. Past Medical History, Surgical History, and Family History has been reviewed and updated. Review of Systems:  Review of Systems   Constitutional:  Negative for chills and fever. HENT:  Positive for congestion, postnasal drip and sinus pain. Negative for sore throat. Eyes:  Negative for pain and discharge. Respiratory:  Positive for cough. Negative for shortness of breath (No new SOb). Cardiovascular:  Negative for chest pain. Gastrointestinal:  Negative for abdominal pain, blood in stool and nausea. Genitourinary:  Negative for flank pain and frequency. Musculoskeletal:  Negative for neck pain. Hematological:  Does not bruise/bleed easily. Psychiatric/Behavioral:  Negative for suicidal ideas. There were no vitals filed for this visit. Physical Exam  Vitals reviewed: Virtual visit. Denies Fever, . Constitutional:       Appearance: Normal appearance. She is not ill-appearing or diaphoretic. HENT:      Head: Normocephalic and atraumatic. Nose: Nose normal.   Eyes:      General:         Right eye: No discharge. Left eye: No discharge. Conjunctiva/sclera: Conjunctivae normal.   Pulmonary:      Effort: Pulmonary effort is normal. No respiratory distress. Skin:     General: Skin is dry. Coloration: Skin is not jaundiced. Neurological:      General: No focal deficit present. Mental Status: She is alert and oriented to person, place, and time. Mental status is at baseline. Psychiatric:         Mood and Affect: Mood normal.         Behavior: Behavior normal.         Thought Content:  Thought content normal.        Labs :    Lab Results   Component Value Date    WBC 5.2 07/06/2022    HGB 11.0 (L) 07/06/2022    HCT 34.7 07/06/2022     07/06/2022    CHOL 178 07/26/2021    TRIG 60 07/26/2021    HDL 72 07/06/2022    ALT 11 07/06/2022    AST 20 07/06/2022  07/06/2022    K 4.3 07/06/2022     07/06/2022    CREATININE 0.7 07/06/2022    BUN 12 07/06/2022    CO2 22 07/06/2022    TSH 2.080 07/06/2022    INR 1.1 02/23/2011    GLUF 86 07/06/2022    LABA1C 5.3 07/26/2021     Lab Results   Component Value Date/Time    COLORU Yellow 07/06/2022 08:24 AM    NITRU Negative 07/06/2022 08:24 AM    GLUCOSEU Negative 07/06/2022 08:24 AM    KETUA Negative 07/06/2022 08:24 AM    UROBILINOGEN 0.2 07/06/2022 08:24 AM    BILIRUBINUR Negative 07/06/2022 08:24 AM    BILIRUBINUR NEG 01/29/2021 10:33 AM           ASSESSMENT     Patient Active Problem List    Diagnosis Date Noted    History of left breast cancer 10/06/2022     Priority: Medium     Overview Note:      S/p Lumpectomy, XRT abd Chemo --[facility] Oct 2021          History of right breast cancer- 2013  10/06/2022     Priority: Medium    Establishing care with new doctor, encounter for 03/28/2022    Hypothyroidism 07/30/2021    Hyperlipidemia 07/30/2021    Essential hypertension 07/30/2021    Depression with anxiety 07/30/2021    History of pulmonary embolism 07/30/2021        Diagnosis:   1. Acute non-recurrent sinusitis, unspecified location  -     azithromycin (ZITHROMAX) 250 MG tablet; Take 2 tabs (500 mg) on Day 1, and take 1 tab (250 mg) on days 2 through 5., Disp-1 packet, R-0Normal  -     benzonatate (TESSALON) 200 MG capsule; Take 1 capsule by mouth 3 times daily as needed for Cough, Disp-30 capsule, R-0Normal  2. Hyperlipidemia, unspecified hyperlipidemia type  Comments:  Hold Pravachol while on Antibiotics          PLAN:     Z pack  Tessalon pearls    Pt is stable on current medical treatment. Continue current treatment plan    Side effects/Adverse effects/Precautions are reviewed with patient. Meds as suggested     Discussed natural and expected course of this diagnosis and need to alert me if symptoms do not follow expected course, or if any worse. Smoking cessation if applicable, discussed with patient. There are no Patient Instructions on file for this visit. Return for As Scheduled earlier, Sooner if no improvements/or symptoms worse. \"Not billed by time\",    This visit was completed virtually using [employer] me

## 2023-01-31 ENCOUNTER — OFFICE VISIT (OUTPATIENT)
Dept: FAMILY MEDICINE CLINIC | Age: 73
End: 2023-01-31
Payer: MEDICARE

## 2023-01-31 VITALS
HEIGHT: 67 IN | WEIGHT: 178 LBS | SYSTOLIC BLOOD PRESSURE: 133 MMHG | RESPIRATION RATE: 17 BRPM | BODY MASS INDEX: 27.94 KG/M2 | TEMPERATURE: 97.5 F | HEART RATE: 62 BPM | DIASTOLIC BLOOD PRESSURE: 73 MMHG | OXYGEN SATURATION: 98 %

## 2023-01-31 DIAGNOSIS — J01.90 ACUTE NON-RECURRENT SINUSITIS, UNSPECIFIED LOCATION: ICD-10-CM

## 2023-01-31 PROCEDURE — G8427 DOCREV CUR MEDS BY ELIG CLIN: HCPCS

## 2023-01-31 PROCEDURE — 3017F COLORECTAL CA SCREEN DOC REV: CPT

## 2023-01-31 PROCEDURE — 1090F PRES/ABSN URINE INCON ASSESS: CPT

## 2023-01-31 PROCEDURE — 99213 OFFICE O/P EST LOW 20 MIN: CPT

## 2023-01-31 PROCEDURE — G8484 FLU IMMUNIZE NO ADMIN: HCPCS

## 2023-01-31 PROCEDURE — 1123F ACP DISCUSS/DSCN MKR DOCD: CPT

## 2023-01-31 PROCEDURE — 3075F SYST BP GE 130 - 139MM HG: CPT

## 2023-01-31 PROCEDURE — 3078F DIAST BP <80 MM HG: CPT

## 2023-01-31 PROCEDURE — G8417 CALC BMI ABV UP PARAM F/U: HCPCS

## 2023-01-31 PROCEDURE — 1036F TOBACCO NON-USER: CPT

## 2023-01-31 PROCEDURE — G8400 PT W/DXA NO RESULTS DOC: HCPCS

## 2023-01-31 RX ORDER — BENZONATATE 200 MG/1
200 CAPSULE ORAL 3 TIMES DAILY PRN
Qty: 30 CAPSULE | Refills: 0 | Status: SHIPPED | OUTPATIENT
Start: 2023-01-31

## 2023-01-31 NOTE — PROGRESS NOTES
23  Nella White : 1950 Sex: female  Age 81 y.o. Subjective:  Chief Complaint   Patient presents with    Cough     Sinus congestion and drainage since        HPI:   [address] , 81 y.o. female presents to the clinic for evaluation of cough x 26 days. The patient also reports sinus congestion/drainage. The patient has taken 2 rounds of antibiotics  for symptoms. The patient reports some improvement in symptoms over time. The patient denies ill exposure. The patient denies acute loss of taste and smell, headache, sore throat, rash, and fever. The patient also denies chest pain, abdominal pain, shortness of breath, wheezing, and nausea / vomiting / diarrhea. Pt has tested for Covid twice with the present illness and the results were negative. ROS:   Unless otherwise stated in this report the patient's positive and negative responses for review of systems for constitutional, eyes, ENT, cardiovascular, respiratory, gastrointestinal, neurological, , musculoskeletal, and integument systems and related systems to the presenting problem are either stated in the history of present illness or were not pertinent or were negative for the symptoms and/or complaints related to the presenting medical problem. Positives and pertinent negatives as per HPI. All others reviewed and are negative. PMH:     Past Medical History:   Diagnosis Date    Anxiety     Breast cancer ([facility] [address] 2013    right side breast cancer     Breast cancer ([facility] [address] 2021    left side breast cancer     CAD (coronary artery disease)     Congenital hearing loss     Depression     seeing Dr. González Hastings. doing better     Hyperlipidemia     Hypertension     Hypothyroidism     Osteopenia     Rosacea     Thyroid disease        Past Surgical History:   Procedure Laterality Date    BREAST SURGERY      Rt. Mastectomy    CARDIAC SURGERY      COLONOSCOPY      ENDOSCOPY, COLON, DIAGNOSTIC      FRACTURE SURGERY Left     wrist fracture     TONSILLECTOMY      TUBAL LIGATION  1982       Family History   Problem Relation Age of Onset    Colon Cancer Mother     Colon Cancer Sister     Cancer Sister     Cancer Father 80        GI cancer, lip cancer     Colon Cancer Brother        Medications:     Current Outpatient Medications:     benzonatate (TESSALON) 200 MG capsule, Take 1 capsule by mouth 3 times daily as needed for Cough, Disp: 30 capsule, Rfl: 0    azithromycin (ZITHROMAX) 250 MG tablet, Take 2 tabs (500 mg) on Day 1, and take 1 tab (250 mg) on days 2 through 5., Disp: 1 packet, Rfl: 0    pravastatin (PRAVACHOL) 40 MG tablet, Take 1 tablet by mouth daily, Disp: 90 tablet, Rfl: 1    losartan (COZAAR) 50 MG tablet, Take 1 tablet by mouth daily, Disp: 90 tablet, Rfl: 1    Omega-3 Fatty Acids (FISH OIL) 1000 MG CAPS, Take 1,000 mg by mouth in the morning and at bedtime, Disp: , Rfl:     zinc 50 MG CAPS, Take 50 mg by mouth daily, Disp: , Rfl:     Probiotic Product (PROBIOMAX DAILY DF) CAPS, , Disp: , Rfl:     anastrozole (ARIMIDEX) 1 MG tablet, Take 1 mg by mouth daily, Disp: , Rfl:     Bioflavonoid Products (VITAMIN C PLUS) 1000 MG TABS, , Disp: , Rfl:     Cholecalciferol (VITAMIN D3) 25 MCG (1000 UT) CAPS, , Disp: , Rfl:     Magnesium Glycinate 665 MG CAPS, Take 665 mg by mouth daily, Disp: , Rfl:     levothyroxine (SYNTHROID) 100 MCG tablet, Take 100 mcg by mouth Daily Sunday and Wednesday taking 150mcg, then 100mcg the rest of the days, Disp: , Rfl:     Multiple Vitamins-Minerals (THERAPEUTIC MULTIVITAMIN-MINERALS) tablet, Take 1 tablet by mouth daily, Disp: , Rfl:     aspirin 81 MG tablet, Take 81 mg by mouth daily, Disp: , Rfl:     Allergies: Allergies   Allergen Reactions    Atorvastatin      Other reaction(s): SEVERE MUSCLE (LEG) CRAMPS    Sulfa Antibiotics      Other reaction(s):  Adverse drug reaction, Unknown  Other reaction(s): URINARY REACTION AS TEENAGER    Celexa [Citalopram]      GI issues         [facility] Calcium]      Leg pains          Social History:     Social History     Tobacco Use    Smoking status: Never    Smokeless tobacco: Never   Vaping Use    Vaping Use: Never used   Substance Use Topics    Alcohol use: Never     Alcohol/week: 14.0 standard drinks     Types: 14 Glasses of wine per week    Drug use: Never       Physical Exam:     Vitals:    01/31/23 0807   BP: 133/73   Site: Left Upper Arm   Position: Sitting   Cuff Size: Medium Adult   Pulse: 62   Resp: 17   Temp: 97.5 °F (36.4 °C)   TempSrc: Temporal   SpO2: 98%   Weight: 178 lb (80.7 kg)   Height: 5' 7\" (1.702 m)       Physical Exam (PE)    Physical Exam  Vitals and nursing note reviewed. Constitutional:       Appearance: She is well-developed. HENT:      Head: Normocephalic and atraumatic. Right Ear: Hearing and external ear normal.      Left Ear: Hearing and external ear normal.      Nose: Congestion present. Mouth/Throat:      Pharynx: Uvula midline. Posterior oropharyngeal erythema present. Comments: Post nasal drip  Eyes:      General: Lids are normal.      Conjunctiva/sclera: Conjunctivae normal.      Pupils: Pupils are equal, round, and reactive to light. Cardiovascular:      Rate and Rhythm: Normal rate and regular rhythm. Heart sounds: Normal heart sounds. No murmur heard. Pulmonary:      Effort: Pulmonary effort is normal.      Breath sounds: Normal breath sounds. Abdominal:      General: Bowel sounds are normal.      Palpations: Abdomen is soft. Abdomen is not rigid. Tenderness: There is no abdominal tenderness. There is no guarding or rebound. Musculoskeletal:      Cervical back: Normal range of motion and neck supple. Skin:     General: Skin is warm and dry. Findings: No abrasion or rash. Neurological:      Mental Status: She is alert and oriented to person, place, and time. Cranial Nerves: No cranial nerve deficit. Sensory: No sensory deficit.       Coordination: Coordination normal. Gait: Gait normal.        Testing:   (All laboratory and radiology results have been personally reviewed by myself)  Labs:  No results found for this visit on 01/31/23. Imaging: All Radiology results interpreted by Radiologist unless otherwise noted. No orders to display       Assessment / Plan:   The patient's vitals, allergies, medications, and past medical history have been reviewed. Nella was seen today for cough. Diagnoses and all orders for this visit:    Acute non-recurrent sinusitis, unspecified location  -     benzonatate (TESSALON) 200 MG capsule; Take 1 capsule by mouth 3 times daily as needed for Cough      - Disposition: Home    - Educational material printed for patient's review and were included in patient instructions. After Visit Summary was given to patient at the end of visit.    -Patient has Claritin and afrin nasal spray and she will use them. Encouraged oral fluids and rest. Discussed symptomatic treatments with patient today. The patient is to schedule a follow-up with PCP in the next 2-3 days for reevaluation. Red flag symptoms were also discussed with the patient today. If symptoms worsen the patient is to go directly to the emergency department for reevaluation and treatment. Pt verbalizes understanding and is in agreement with plan of care. All questions answered. SIGNATURE: KIERRA Orr - CNP      *NOTE: This report was transcribed using voice recognition software. Every effort was made to ensure accuracy; however, inadvertent computerized transcription errors may be present.

## 2023-02-06 ENCOUNTER — TELEPHONE (OUTPATIENT)
Dept: FAMILY MEDICINE CLINIC | Age: 73
End: 2023-02-06

## 2023-02-06 DIAGNOSIS — R09.82 POST-NASAL DRIP: Primary | ICD-10-CM

## 2023-02-06 RX ORDER — AZELASTINE 1 MG/ML
1 SPRAY, METERED NASAL 2 TIMES DAILY
Qty: 60 ML | Refills: 1 | Status: SHIPPED | OUTPATIENT
Start: 2023-02-06

## 2023-02-06 NOTE — TELEPHONE ENCOUNTER
MD Aramis Mendes 1 minute ago (12:36 PM)     VS  Sent nasal spray Azelastine to her pharmacy. Continue Flonase as well   If no improvement, need to see in office     Called patient and informed her of the above.  Pt verbalized understanding and agreed

## 2023-02-06 NOTE — TELEPHONE ENCOUNTER
Patient called stating she still has a cough and post-nasal drip which she's been dealing with for awhile now. Patient informed she's been to Urgent Care, had a VV Appt and has most recently been seen in the [address] [place] [address]. Patient stated she is sleeping through the night and symptoms are during the day. Patient informed she is using Flonase 1x/day and Afrin 2x/day for decongestant. Patient stated if she could get rid of the post-nasal drip, she believes her cough would go away. Please advise.     Last seen 1/25/2023  Next appt 3/6/2023  [facility]

## 2023-03-01 ENCOUNTER — TELEPHONE (OUTPATIENT)
Dept: FAMILY MEDICINE CLINIC | Age: 73
End: 2023-03-01

## 2023-03-01 DIAGNOSIS — E78.5 HYPERLIPIDEMIA, UNSPECIFIED HYPERLIPIDEMIA TYPE: ICD-10-CM

## 2023-03-01 DIAGNOSIS — I10 ESSENTIAL HYPERTENSION: ICD-10-CM

## 2023-03-01 LAB
ALBUMIN SERPL-MCNC: 3.8 G/DL (ref 3.5–5.2)
ALP BLD-CCNC: 56 U/L (ref 35–104)
ALT SERPL-CCNC: 11 U/L (ref 0–32)
ANION GAP SERPL CALCULATED.3IONS-SCNC: 13 MMOL/L (ref 7–16)
AST SERPL-CCNC: 22 U/L (ref 0–31)
BASOPHILS ABSOLUTE: 0.1 E9/L (ref 0–0.2)
BASOPHILS RELATIVE PERCENT: 1.5 % (ref 0–2)
BILIRUB SERPL-MCNC: 0.2 MG/DL (ref 0–1.2)
BUN BLDV-MCNC: 16 MG/DL (ref 6–23)
CALCIUM SERPL-MCNC: 9.1 MG/DL (ref 8.6–10.2)
CHLORIDE BLD-SCNC: 108 MMOL/L (ref 98–107)
CHOLESTEROL, FASTING: 172 MG/DL (ref 0–199)
CO2: 21 MMOL/L (ref 22–29)
CREAT SERPL-MCNC: 0.6 MG/DL (ref 0.5–1)
EOSINOPHILS ABSOLUTE: 0.24 E9/L (ref 0.05–0.5)
EOSINOPHILS RELATIVE PERCENT: 3.6 % (ref 0–6)
GFR SERPL CREATININE-BSD FRML MDRD: >60 ML/MIN/1.73
GLUCOSE FASTING: 77 MG/DL (ref 74–99)
HCT VFR BLD CALC: 34.7 % (ref 34–48)
HDLC SERPL-MCNC: 65 MG/DL
HEMOGLOBIN: 11.2 G/DL (ref 11.5–15.5)
IMMATURE GRANULOCYTES #: 0.02 E9/L
IMMATURE GRANULOCYTES %: 0.3 % (ref 0–5)
LDL CHOLESTEROL CALCULATED: 97 MG/DL (ref 0–99)
LYMPHOCYTES ABSOLUTE: 1.58 E9/L (ref 1.5–4)
LYMPHOCYTES RELATIVE PERCENT: 23.5 % (ref 20–42)
MCH RBC QN AUTO: 31.2 PG (ref 26–35)
MCHC RBC AUTO-ENTMCNC: 32.3 % (ref 32–34.5)
MCV RBC AUTO: 96.7 FL (ref 80–99.9)
MONOCYTES ABSOLUTE: 0.62 E9/L (ref 0.1–0.95)
MONOCYTES RELATIVE PERCENT: 9.2 % (ref 2–12)
NEUTROPHILS ABSOLUTE: 4.16 E9/L (ref 1.8–7.3)
NEUTROPHILS RELATIVE PERCENT: 61.9 % (ref 43–80)
PDW BLD-RTO: 13.9 FL (ref 11.5–15)
PLATELET # BLD: 307 E9/L (ref 130–450)
PMV BLD AUTO: 9.8 FL (ref 7–12)
POTASSIUM SERPL-SCNC: 4.5 MMOL/L (ref 3.5–5)
RBC # BLD: 3.59 E12/L (ref 3.5–5.5)
SODIUM BLD-SCNC: 142 MMOL/L (ref 132–146)
TOTAL PROTEIN: 6.9 G/DL (ref 6.4–8.3)
TRIGLYCERIDE, FASTING: 50 MG/DL (ref 0–149)
VLDLC SERPL CALC-MCNC: 10 MG/DL
WBC # BLD: 6.7 E9/L (ref 4.5–11.5)

## 2023-03-01 NOTE — TELEPHONE ENCOUNTER
----- Message from Piedad Magallon sent at 3/1/2023  9:58 AM EST -----  Subject: Results Request    QUESTIONS  Results: labs;  Ordered by:   Date Performed: 2023-03-01  ---------------------------------------------------------------------------  --------------  Mo SOTO    [phone]; OK to leave message on voicemail  ---------------------------------------------------------------------------  --------------

## 2023-05-29 SDOH — ECONOMIC STABILITY: TRANSPORTATION INSECURITY
IN THE PAST 12 MONTHS, HAS LACK OF TRANSPORTATION KEPT YOU FROM MEETINGS, WORK, OR FROM GETTING THINGS NEEDED FOR DAILY LIVING?: NO

## 2023-05-29 SDOH — ECONOMIC STABILITY: FOOD INSECURITY: WITHIN THE PAST 12 MONTHS, YOU WORRIED THAT YOUR FOOD WOULD RUN OUT BEFORE YOU GOT MONEY TO BUY MORE.: NEVER TRUE

## 2023-05-29 SDOH — ECONOMIC STABILITY: FOOD INSECURITY: WITHIN THE PAST 12 MONTHS, THE FOOD YOU BOUGHT JUST DIDN'T LAST AND YOU DIDN'T HAVE MONEY TO GET MORE.: NEVER TRUE

## 2023-05-29 SDOH — ECONOMIC STABILITY: HOUSING INSECURITY
IN THE LAST 12 MONTHS, WAS THERE A TIME WHEN YOU DID NOT HAVE A STEADY PLACE TO SLEEP OR SLEPT IN A SHELTER (INCLUDING NOW)?: NO

## 2023-05-29 SDOH — ECONOMIC STABILITY: INCOME INSECURITY: HOW HARD IS IT FOR YOU TO PAY FOR THE VERY BASICS LIKE FOOD, HOUSING, MEDICAL CARE, AND HEATING?: NOT HARD AT ALL

## 2023-05-31 ENCOUNTER — OFFICE VISIT (OUTPATIENT)
Dept: FAMILY MEDICINE CLINIC | Age: 73
End: 2023-05-31
Payer: MEDICARE

## 2023-05-31 VITALS
HEIGHT: 67 IN | HEART RATE: 58 BPM | BODY MASS INDEX: 28.2 KG/M2 | TEMPERATURE: 97.1 F | OXYGEN SATURATION: 97 % | WEIGHT: 179.7 LBS | DIASTOLIC BLOOD PRESSURE: 72 MMHG | SYSTOLIC BLOOD PRESSURE: 118 MMHG

## 2023-05-31 DIAGNOSIS — E78.5 HYPERLIPIDEMIA, UNSPECIFIED HYPERLIPIDEMIA TYPE: ICD-10-CM

## 2023-05-31 DIAGNOSIS — E03.9 HYPOTHYROIDISM, UNSPECIFIED TYPE: ICD-10-CM

## 2023-05-31 DIAGNOSIS — Z85.3 HISTORY OF LEFT BREAST CANCER: ICD-10-CM

## 2023-05-31 DIAGNOSIS — Z85.3 HISTORY OF RIGHT BREAST CANCER: ICD-10-CM

## 2023-05-31 DIAGNOSIS — Z86.711 HISTORY OF PULMONARY EMBOLISM: ICD-10-CM

## 2023-05-31 DIAGNOSIS — Z23 NEED FOR SHINGLES VACCINE: ICD-10-CM

## 2023-05-31 DIAGNOSIS — I10 ESSENTIAL HYPERTENSION: Primary | ICD-10-CM

## 2023-05-31 PROCEDURE — 99214 OFFICE O/P EST MOD 30 MIN: CPT | Performed by: INTERNAL MEDICINE

## 2023-05-31 PROCEDURE — 3078F DIAST BP <80 MM HG: CPT | Performed by: INTERNAL MEDICINE

## 2023-05-31 PROCEDURE — 1090F PRES/ABSN URINE INCON ASSESS: CPT | Performed by: INTERNAL MEDICINE

## 2023-05-31 PROCEDURE — 3074F SYST BP LT 130 MM HG: CPT | Performed by: INTERNAL MEDICINE

## 2023-05-31 PROCEDURE — 3017F COLORECTAL CA SCREEN DOC REV: CPT | Performed by: INTERNAL MEDICINE

## 2023-05-31 PROCEDURE — G8400 PT W/DXA NO RESULTS DOC: HCPCS | Performed by: INTERNAL MEDICINE

## 2023-05-31 PROCEDURE — 1123F ACP DISCUSS/DSCN MKR DOCD: CPT | Performed by: INTERNAL MEDICINE

## 2023-05-31 PROCEDURE — G8417 CALC BMI ABV UP PARAM F/U: HCPCS | Performed by: INTERNAL MEDICINE

## 2023-05-31 PROCEDURE — 1036F TOBACCO NON-USER: CPT | Performed by: INTERNAL MEDICINE

## 2023-05-31 PROCEDURE — G8427 DOCREV CUR MEDS BY ELIG CLIN: HCPCS | Performed by: INTERNAL MEDICINE

## 2023-05-31 RX ORDER — FLUTICASONE PROPIONATE 50 MCG
1 SPRAY, SUSPENSION (ML) NASAL DAILY
COMMUNITY

## 2023-05-31 RX ORDER — ZOSTER VACCINE RECOMBINANT, ADJUVANTED 50 MCG/0.5
0.5 KIT INTRAMUSCULAR SEE ADMIN INSTRUCTIONS
Qty: 0.5 ML | Refills: 0 | Status: SHIPPED | OUTPATIENT
Start: 2023-05-31 | End: 2023-11-27

## 2023-05-31 RX ORDER — PRAVASTATIN SODIUM 40 MG
40 TABLET ORAL DAILY
Qty: 90 TABLET | Refills: 1 | Status: SHIPPED | OUTPATIENT
Start: 2023-05-31

## 2023-05-31 RX ORDER — CETIRIZINE HYDROCHLORIDE 5 MG/1
5 TABLET ORAL DAILY
COMMUNITY

## 2023-05-31 RX ORDER — LOSARTAN POTASSIUM 50 MG/1
50 TABLET ORAL DAILY
Qty: 90 TABLET | Refills: 1 | Status: SHIPPED | OUTPATIENT
Start: 2023-05-31

## 2023-05-31 ASSESSMENT — ENCOUNTER SYMPTOMS
SORE THROAT: 0
SINUS PAIN: 0
SHORTNESS OF BREATH: 0
BACK PAIN: 1
NAUSEA: 0
BLOOD IN STOOL: 0
EYE PAIN: 0
ABDOMINAL PAIN: 0
EYE DISCHARGE: 0

## 2023-05-31 NOTE — PROGRESS NOTES
Chief Complaint   Patient presents with    Hypertension     Pt here for follow up on hypertension, pt reports feeling great. Discuss Labs     Completed on 03/01/2023     Health Maintenance     DEXA, Shingles vaccine, DTaP        HPI:  Patient is here for follow-up     Allergy and Medications are reviewed and updated. Past Medical History, Surgical History, and Family History has been reviewed and updated. Review of Systems:  Review of Systems   Constitutional:  Negative for chills and fever. HENT:  Negative for congestion, sinus pain and sore throat. Eyes:  Negative for pain and discharge. Respiratory:  Negative for shortness of breath (No new SOb). Cardiovascular:  Negative for chest pain. Gastrointestinal:  Negative for abdominal pain, blood in stool and nausea. Genitourinary:  Negative for flank pain and frequency. Musculoskeletal:  Positive for arthralgias and back pain. Negative for neck pain. Hematological:  Does not bruise/bleed easily. Psychiatric/Behavioral:  Negative for suicidal ideas. Vitals:    05/31/23 1126   BP: 118/72   Position: Sitting   Pulse: 58   Temp: 97.1 °F (36.2 °C)   TempSrc: Temporal   SpO2: 97%   Weight: 179 lb 11.2 oz (81.5 kg)   Height: 5' 7\" (1.702 m)       Physical Exam  Vitals reviewed. Constitutional:       Appearance: She is well-developed. HENT:      Head: Normocephalic and atraumatic. Nose: Nose normal.   Eyes:      Conjunctiva/sclera: Conjunctivae normal.      Pupils: Pupils are equal, round, and reactive to light. Neck:      Vascular: No JVD. Cardiovascular:      Rate and Rhythm: Normal rate and regular rhythm. Pulmonary:      Effort: Pulmonary effort is normal.      Breath sounds: Normal breath sounds. Abdominal:      General: Bowel sounds are normal.      Palpations: Abdomen is soft. Musculoskeletal:         General: Normal range of motion. Skin:     General: Skin is warm and dry.    Neurological:      Mental Status: She

## 2023-07-06 SDOH — HEALTH STABILITY: PHYSICAL HEALTH: ON AVERAGE, HOW MANY DAYS PER WEEK DO YOU ENGAGE IN MODERATE TO STRENUOUS EXERCISE (LIKE A BRISK WALK)?: 3 DAYS

## 2023-07-06 SDOH — HEALTH STABILITY: PHYSICAL HEALTH: ON AVERAGE, HOW MANY MINUTES DO YOU ENGAGE IN EXERCISE AT THIS LEVEL?: 20 MIN

## 2023-07-06 ASSESSMENT — PATIENT HEALTH QUESTIONNAIRE - PHQ9
SUM OF ALL RESPONSES TO PHQ QUESTIONS 1-9: 0
SUM OF ALL RESPONSES TO PHQ9 QUESTIONS 1 & 2: 0
2. FEELING DOWN, DEPRESSED OR HOPELESS: 0
8. MOVING OR SPEAKING SO SLOWLY THAT OTHER PEOPLE COULD HAVE NOTICED. OR THE OPPOSITE, BEING SO FIGETY OR RESTLESS THAT YOU HAVE BEEN MOVING AROUND A LOT MORE THAN USUAL: 0
6. FEELING BAD ABOUT YOURSELF - OR THAT YOU ARE A FAILURE OR HAVE LET YOURSELF OR YOUR FAMILY DOWN: 0
3. TROUBLE FALLING OR STAYING ASLEEP: 0
9. THOUGHTS THAT YOU WOULD BE BETTER OFF DEAD, OR OF HURTING YOURSELF: 0
5. POOR APPETITE OR OVEREATING: 0
10. IF YOU CHECKED OFF ANY PROBLEMS, HOW DIFFICULT HAVE THESE PROBLEMS MADE IT FOR YOU TO DO YOUR WORK, TAKE CARE OF THINGS AT HOME, OR GET ALONG WITH OTHER PEOPLE: 0
7. TROUBLE CONCENTRATING ON THINGS, SUCH AS READING THE NEWSPAPER OR WATCHING TELEVISION: 0
SUM OF ALL RESPONSES TO PHQ QUESTIONS 1-9: 0
1. LITTLE INTEREST OR PLEASURE IN DOING THINGS: 0
4. FEELING TIRED OR HAVING LITTLE ENERGY: 0

## 2023-07-06 ASSESSMENT — LIFESTYLE VARIABLES
HOW OFTEN DO YOU HAVE A DRINK CONTAINING ALCOHOL: 1
HOW MANY STANDARD DRINKS CONTAINING ALCOHOL DO YOU HAVE ON A TYPICAL DAY: 0
HOW OFTEN DO YOU HAVE SIX OR MORE DRINKS ON ONE OCCASION: 1
HOW OFTEN DO YOU HAVE A DRINK CONTAINING ALCOHOL: NEVER
HOW MANY STANDARD DRINKS CONTAINING ALCOHOL DO YOU HAVE ON A TYPICAL DAY: PATIENT DOES NOT DRINK

## 2023-07-10 ENCOUNTER — OFFICE VISIT (OUTPATIENT)
Dept: FAMILY MEDICINE CLINIC | Age: 73
End: 2023-07-10
Payer: MEDICARE

## 2023-07-10 VITALS
OXYGEN SATURATION: 97 % | WEIGHT: 181 LBS | SYSTOLIC BLOOD PRESSURE: 124 MMHG | HEIGHT: 67 IN | HEART RATE: 61 BPM | BODY MASS INDEX: 28.41 KG/M2 | DIASTOLIC BLOOD PRESSURE: 78 MMHG | TEMPERATURE: 97.2 F

## 2023-07-10 DIAGNOSIS — Z00.00 MEDICARE ANNUAL WELLNESS VISIT, SUBSEQUENT: Primary | ICD-10-CM

## 2023-07-10 PROCEDURE — 1123F ACP DISCUSS/DSCN MKR DOCD: CPT | Performed by: INTERNAL MEDICINE

## 2023-07-10 PROCEDURE — G0439 PPPS, SUBSEQ VISIT: HCPCS | Performed by: INTERNAL MEDICINE

## 2023-07-10 PROCEDURE — 3074F SYST BP LT 130 MM HG: CPT | Performed by: INTERNAL MEDICINE

## 2023-07-10 PROCEDURE — 3017F COLORECTAL CA SCREEN DOC REV: CPT | Performed by: INTERNAL MEDICINE

## 2023-07-10 PROCEDURE — 3078F DIAST BP <80 MM HG: CPT | Performed by: INTERNAL MEDICINE

## 2023-07-10 RX ORDER — CYANOCOBALAMIN/FOLIC ACID 1MG-400MCG
TABLET, SUBLINGUAL SUBLINGUAL
COMMUNITY

## 2023-07-27 LAB — MAMMOGRAPHY, EXTERNAL: NORMAL

## 2023-10-18 ENCOUNTER — OFFICE VISIT (OUTPATIENT)
Dept: FAMILY MEDICINE CLINIC | Age: 73
End: 2023-10-18
Payer: MEDICARE

## 2023-10-18 VITALS
TEMPERATURE: 97.2 F | DIASTOLIC BLOOD PRESSURE: 74 MMHG | OXYGEN SATURATION: 97 % | HEIGHT: 67 IN | WEIGHT: 184.9 LBS | HEART RATE: 56 BPM | SYSTOLIC BLOOD PRESSURE: 122 MMHG | BODY MASS INDEX: 29.02 KG/M2

## 2023-10-18 DIAGNOSIS — E03.9 HYPOTHYROIDISM, UNSPECIFIED TYPE: ICD-10-CM

## 2023-10-18 DIAGNOSIS — I10 ESSENTIAL HYPERTENSION: Primary | ICD-10-CM

## 2023-10-18 DIAGNOSIS — Z85.3 HISTORY OF LEFT BREAST CANCER: ICD-10-CM

## 2023-10-18 DIAGNOSIS — E55.9 VITAMIN D DEFICIENCY: ICD-10-CM

## 2023-10-18 DIAGNOSIS — Z85.3 HISTORY OF RIGHT BREAST CANCER: ICD-10-CM

## 2023-10-18 DIAGNOSIS — E78.5 HYPERLIPIDEMIA, UNSPECIFIED HYPERLIPIDEMIA TYPE: ICD-10-CM

## 2023-10-18 PROCEDURE — G8427 DOCREV CUR MEDS BY ELIG CLIN: HCPCS | Performed by: INTERNAL MEDICINE

## 2023-10-18 PROCEDURE — 1036F TOBACCO NON-USER: CPT | Performed by: INTERNAL MEDICINE

## 2023-10-18 PROCEDURE — G8417 CALC BMI ABV UP PARAM F/U: HCPCS | Performed by: INTERNAL MEDICINE

## 2023-10-18 PROCEDURE — G8484 FLU IMMUNIZE NO ADMIN: HCPCS | Performed by: INTERNAL MEDICINE

## 2023-10-18 PROCEDURE — 3078F DIAST BP <80 MM HG: CPT | Performed by: INTERNAL MEDICINE

## 2023-10-18 PROCEDURE — 3074F SYST BP LT 130 MM HG: CPT | Performed by: INTERNAL MEDICINE

## 2023-10-18 PROCEDURE — 3017F COLORECTAL CA SCREEN DOC REV: CPT | Performed by: INTERNAL MEDICINE

## 2023-10-18 PROCEDURE — 1090F PRES/ABSN URINE INCON ASSESS: CPT | Performed by: INTERNAL MEDICINE

## 2023-10-18 PROCEDURE — 1123F ACP DISCUSS/DSCN MKR DOCD: CPT | Performed by: INTERNAL MEDICINE

## 2023-10-18 PROCEDURE — 99214 OFFICE O/P EST MOD 30 MIN: CPT | Performed by: INTERNAL MEDICINE

## 2023-10-18 PROCEDURE — G8400 PT W/DXA NO RESULTS DOC: HCPCS | Performed by: INTERNAL MEDICINE

## 2023-10-18 RX ORDER — ALPRAZOLAM 0.5 MG/1
0.5 TABLET ORAL PRN
COMMUNITY

## 2023-10-18 RX ORDER — PRAVASTATIN SODIUM 40 MG
40 TABLET ORAL DAILY
Qty: 90 TABLET | Refills: 1 | Status: SHIPPED | OUTPATIENT
Start: 2023-10-18

## 2023-10-18 RX ORDER — LOSARTAN POTASSIUM 50 MG/1
50 TABLET ORAL DAILY
Qty: 90 TABLET | Refills: 1 | Status: SHIPPED | OUTPATIENT
Start: 2023-10-18

## 2023-10-18 ASSESSMENT — ENCOUNTER SYMPTOMS
SORE THROAT: 0
SINUS PAIN: 0
SHORTNESS OF BREATH: 0
NAUSEA: 0
EYE DISCHARGE: 0
EYE PAIN: 0
BLOOD IN STOOL: 0
ABDOMINAL PAIN: 0

## 2024-01-12 DIAGNOSIS — E03.9 HYPOTHYROIDISM, UNSPECIFIED TYPE: ICD-10-CM

## 2024-01-12 DIAGNOSIS — I10 ESSENTIAL HYPERTENSION: ICD-10-CM

## 2024-01-12 DIAGNOSIS — E55.9 VITAMIN D DEFICIENCY: ICD-10-CM

## 2024-01-12 DIAGNOSIS — E78.5 HYPERLIPIDEMIA, UNSPECIFIED HYPERLIPIDEMIA TYPE: ICD-10-CM

## 2024-01-12 LAB
ABSOLUTE IMMATURE GRANULOCYTE: <0.03 K/UL (ref 0–0.58)
ALBUMIN SERPL-MCNC: 3.9 G/DL (ref 3.5–5.2)
ALP BLD-CCNC: 86 U/L (ref 35–104)
ALT SERPL-CCNC: <5 U/L (ref 0–32)
ANION GAP SERPL CALCULATED.3IONS-SCNC: 15 MMOL/L (ref 7–16)
AST SERPL-CCNC: 22 U/L (ref 0–31)
BASOPHILS ABSOLUTE: 0.11 K/UL (ref 0–0.2)
BASOPHILS RELATIVE PERCENT: 2 % (ref 0–2)
BILIRUB SERPL-MCNC: 0.2 MG/DL (ref 0–1.2)
BILIRUBIN URINE: NEGATIVE
BUN BLDV-MCNC: 17 MG/DL (ref 6–23)
CALCIUM SERPL-MCNC: 9.5 MG/DL (ref 8.6–10.2)
CHLORIDE BLD-SCNC: 109 MMOL/L (ref 98–107)
CHOLESTEROL, FASTING: 166 MG/DL
CO2: 18 MMOL/L (ref 22–29)
COLOR: YELLOW
CREAT SERPL-MCNC: 0.6 MG/DL (ref 0.5–1)
EOSINOPHILS ABSOLUTE: 0.21 K/UL (ref 0.05–0.5)
EOSINOPHILS RELATIVE PERCENT: 3 % (ref 0–6)
GFR SERPL CREATININE-BSD FRML MDRD: >60 ML/MIN/1.73M2
GLUCOSE FASTING: 84 MG/DL (ref 74–99)
GLUCOSE URINE: NEGATIVE MG/DL
HCT VFR BLD CALC: 39.7 % (ref 34–48)
HDLC SERPL-MCNC: 62 MG/DL
HEMOGLOBIN: 12.6 G/DL (ref 11.5–15.5)
IMMATURE GRANULOCYTES: 0 % (ref 0–5)
KETONES, URINE: NEGATIVE MG/DL
LDL CHOLESTEROL: 94 MG/DL
LEUKOCYTE ESTERASE, URINE: ABNORMAL
LYMPHOCYTES ABSOLUTE: 1.8 K/UL (ref 1.5–4)
LYMPHOCYTES RELATIVE PERCENT: 24 % (ref 20–42)
MCH RBC QN AUTO: 32 PG (ref 26–35)
MCHC RBC AUTO-ENTMCNC: 31.7 G/DL (ref 32–34.5)
MCV RBC AUTO: 100.8 FL (ref 80–99.9)
MONOCYTES ABSOLUTE: 0.64 K/UL (ref 0.1–0.95)
MONOCYTES RELATIVE PERCENT: 9 % (ref 2–12)
NEUTROPHILS ABSOLUTE: 4.8 K/UL (ref 1.8–7.3)
NEUTROPHILS RELATIVE PERCENT: 63 % (ref 43–80)
NITRITE, URINE: NEGATIVE
PDW BLD-RTO: 13.8 % (ref 11.5–15)
PH UA: 6 (ref 5–9)
PLATELET # BLD: 320 K/UL (ref 130–450)
PMV BLD AUTO: 9.4 FL (ref 7–12)
POTASSIUM SERPL-SCNC: 4.8 MMOL/L (ref 3.5–5)
PROTEIN UA: NEGATIVE MG/DL
RBC # BLD: 3.94 M/UL (ref 3.5–5.5)
RBC UA: ABNORMAL /HPF
SODIUM BLD-SCNC: 142 MMOL/L (ref 132–146)
SPECIFIC GRAVITY UA: 1.01 (ref 1–1.03)
TOTAL PROTEIN: 7.7 G/DL (ref 6.4–8.3)
TRIGLYCERIDE, FASTING: 50 MG/DL
TSH SERPL DL<=0.05 MIU/L-ACNC: 1.4 UIU/ML (ref 0.27–4.2)
TURBIDITY: CLEAR
URINE HGB: NEGATIVE
UROBILINOGEN, URINE: 0.2 EU/DL (ref 0–1)
VITAMIN D 25-HYDROXY: 63.7 NG/ML (ref 30–100)
VLDLC SERPL CALC-MCNC: 10 MG/DL
WBC # BLD: 7.6 K/UL (ref 4.5–11.5)
WBC UA: ABNORMAL /HPF

## 2024-01-18 ENCOUNTER — OFFICE VISIT (OUTPATIENT)
Dept: FAMILY MEDICINE CLINIC | Age: 74
End: 2024-01-18

## 2024-01-18 VITALS
SYSTOLIC BLOOD PRESSURE: 114 MMHG | HEIGHT: 67 IN | OXYGEN SATURATION: 98 % | HEART RATE: 62 BPM | WEIGHT: 184.2 LBS | BODY MASS INDEX: 28.91 KG/M2 | DIASTOLIC BLOOD PRESSURE: 70 MMHG | TEMPERATURE: 98 F

## 2024-01-18 DIAGNOSIS — J30.89 ALLERGIC RHINITIS DUE TO OTHER ALLERGIC TRIGGER, UNSPECIFIED SEASONALITY: ICD-10-CM

## 2024-01-18 DIAGNOSIS — Z85.3 HISTORY OF LEFT BREAST CANCER: ICD-10-CM

## 2024-01-18 DIAGNOSIS — E03.9 HYPOTHYROIDISM, UNSPECIFIED TYPE: ICD-10-CM

## 2024-01-18 DIAGNOSIS — E78.5 HYPERLIPIDEMIA, UNSPECIFIED HYPERLIPIDEMIA TYPE: Primary | ICD-10-CM

## 2024-01-18 DIAGNOSIS — I10 ESSENTIAL HYPERTENSION: ICD-10-CM

## 2024-01-18 RX ORDER — LOSARTAN POTASSIUM 50 MG/1
50 TABLET ORAL DAILY
Qty: 90 TABLET | Refills: 1 | Status: SHIPPED | OUTPATIENT
Start: 2024-01-18

## 2024-01-18 ASSESSMENT — PATIENT HEALTH QUESTIONNAIRE - PHQ9
1. LITTLE INTEREST OR PLEASURE IN DOING THINGS: 0
10. IF YOU CHECKED OFF ANY PROBLEMS, HOW DIFFICULT HAVE THESE PROBLEMS MADE IT FOR YOU TO DO YOUR WORK, TAKE CARE OF THINGS AT HOME, OR GET ALONG WITH OTHER PEOPLE: 0
SUM OF ALL RESPONSES TO PHQ QUESTIONS 1-9: 0
6. FEELING BAD ABOUT YOURSELF - OR THAT YOU ARE A FAILURE OR HAVE LET YOURSELF OR YOUR FAMILY DOWN: 0
8. MOVING OR SPEAKING SO SLOWLY THAT OTHER PEOPLE COULD HAVE NOTICED. OR THE OPPOSITE, BEING SO FIGETY OR RESTLESS THAT YOU HAVE BEEN MOVING AROUND A LOT MORE THAN USUAL: 0
SUM OF ALL RESPONSES TO PHQ9 QUESTIONS 1 & 2: 0
4. FEELING TIRED OR HAVING LITTLE ENERGY: 0
3. TROUBLE FALLING OR STAYING ASLEEP: 0
SUM OF ALL RESPONSES TO PHQ QUESTIONS 1-9: 0
2. FEELING DOWN, DEPRESSED OR HOPELESS: 0
SUM OF ALL RESPONSES TO PHQ QUESTIONS 1-9: 0
7. TROUBLE CONCENTRATING ON THINGS, SUCH AS READING THE NEWSPAPER OR WATCHING TELEVISION: 0
SUM OF ALL RESPONSES TO PHQ QUESTIONS 1-9: 0
9. THOUGHTS THAT YOU WOULD BE BETTER OFF DEAD, OR OF HURTING YOURSELF: 0
5. POOR APPETITE OR OVEREATING: 0

## 2024-01-18 ASSESSMENT — ENCOUNTER SYMPTOMS
SORE THROAT: 0
EYE PAIN: 0
SHORTNESS OF BREATH: 0
NAUSEA: 0
SINUS PAIN: 0
BLOOD IN STOOL: 0
ABDOMINAL PAIN: 0
EYE DISCHARGE: 0

## 2024-01-18 NOTE — PROGRESS NOTES
Chief Complaint   Patient presents with    Hypertension     Pt here for follow up on hypertension, pt reports feeling good     Discuss Labs     Completed on 01/12/2024     Medication Refill       HPI:  Patient is here for follow-up       Feeling well    No complaints      Allergy and Medications are reviewed and updated.  Past Medical History, Surgical History, and Family History has been reviewed and updated.    Review of Systems:  Review of Systems   Constitutional:  Negative for chills and fever.   HENT:  Negative for congestion, sinus pain and sore throat.    Eyes:  Negative for pain and discharge.   Respiratory:  Negative for shortness of breath (No new SOb).    Cardiovascular:  Negative for chest pain.   Gastrointestinal:  Negative for abdominal pain, blood in stool and nausea.   Genitourinary:  Negative for flank pain and frequency.   Musculoskeletal:  Negative for neck pain.   Hematological:  Does not bruise/bleed easily.   Psychiatric/Behavioral:  Negative for suicidal ideas.          Vitals:    01/18/24 1509   BP: 114/70   Position: Sitting   Pulse: 62   Temp: 98 °F (36.7 °C)   TempSrc: Temporal   SpO2: 98%   Weight: 83.6 kg (184 lb 3.2 oz)   Height: 1.702 m (5' 7\")       Physical Exam  Vitals reviewed.   Constitutional:       Appearance: She is well-developed.   HENT:      Head: Normocephalic and atraumatic.      Nose: Nose normal.   Eyes:      Conjunctiva/sclera: Conjunctivae normal.      Pupils: Pupils are equal, round, and reactive to light.   Neck:      Vascular: No JVD.   Cardiovascular:      Rate and Rhythm: Normal rate and regular rhythm.   Pulmonary:      Effort: Pulmonary effort is normal.      Breath sounds: Normal breath sounds.   Abdominal:      General: Bowel sounds are normal.      Palpations: Abdomen is soft.   Musculoskeletal:         General: Normal range of motion.   Skin:     General: Skin is warm and dry.   Neurological:      Mental Status: She is alert and oriented to person, place,

## 2024-05-09 ENCOUNTER — OFFICE VISIT (OUTPATIENT)
Dept: FAMILY MEDICINE CLINIC | Age: 74
End: 2024-05-09

## 2024-05-09 VITALS
TEMPERATURE: 97.8 F | HEIGHT: 67 IN | BODY MASS INDEX: 28.75 KG/M2 | HEART RATE: 75 BPM | WEIGHT: 183.2 LBS | DIASTOLIC BLOOD PRESSURE: 78 MMHG | SYSTOLIC BLOOD PRESSURE: 126 MMHG | OXYGEN SATURATION: 98 %

## 2024-05-09 DIAGNOSIS — E03.9 HYPOTHYROIDISM, UNSPECIFIED TYPE: ICD-10-CM

## 2024-05-09 DIAGNOSIS — E78.5 HYPERLIPIDEMIA, UNSPECIFIED HYPERLIPIDEMIA TYPE: ICD-10-CM

## 2024-05-09 DIAGNOSIS — I10 ESSENTIAL HYPERTENSION: Primary | ICD-10-CM

## 2024-05-09 DIAGNOSIS — Z85.3 HISTORY OF RIGHT BREAST CANCER: ICD-10-CM

## 2024-05-09 DIAGNOSIS — Z85.3 HISTORY OF LEFT BREAST CANCER: ICD-10-CM

## 2024-05-09 ASSESSMENT — ENCOUNTER SYMPTOMS
BLOOD IN STOOL: 0
SINUS PAIN: 0
EYE PAIN: 0
SORE THROAT: 0
EYE DISCHARGE: 0
SHORTNESS OF BREATH: 0
NAUSEA: 0
ABDOMINAL PAIN: 0

## 2024-05-09 NOTE — ASSESSMENT & PLAN NOTE
Well-controlled, continue current medications    Pravastatin 40 mg qd  Will recheck labs prior to next visit

## 2024-05-09 NOTE — ASSESSMENT & PLAN NOTE
Well-controlled, continue current medications    Levothyroxine 100 mg qd x 5 days and 150 mg x 2 days a week

## 2024-05-09 NOTE — PROGRESS NOTES
questions that were not answered at your visit, please contact the office.    Advise to have ( Fasting) lab test prior to next visit.          Return in about 2 months (around 7/16/2024) for Annual Medicare Wellness Exam, As Scheduled earlier.

## 2024-05-09 NOTE — ASSESSMENT & PLAN NOTE
S/p Lumpectomy, XRT abd Chemo --[facility] Oct 2021     Pt had Tele med visit 1/29/24- note is reviewed  Stable

## 2024-05-21 DIAGNOSIS — R09.82 POST-NASAL DRIP: ICD-10-CM

## 2024-05-21 DIAGNOSIS — E78.5 HYPERLIPIDEMIA, UNSPECIFIED HYPERLIPIDEMIA TYPE: ICD-10-CM

## 2024-05-22 RX ORDER — AZELASTINE 1 MG/ML
1 SPRAY, METERED NASAL 2 TIMES DAILY PRN
Qty: 3 EACH | Refills: 0 | Status: SHIPPED | OUTPATIENT
Start: 2024-05-22

## 2024-05-22 RX ORDER — PRAVASTATIN SODIUM 40 MG
40 TABLET ORAL DAILY
Qty: 90 TABLET | Refills: 1 | Status: SHIPPED | OUTPATIENT
Start: 2024-05-22

## 2024-08-19 DIAGNOSIS — E03.9 HYPOTHYROIDISM, UNSPECIFIED TYPE: ICD-10-CM

## 2024-08-19 DIAGNOSIS — E78.5 HYPERLIPIDEMIA, UNSPECIFIED HYPERLIPIDEMIA TYPE: ICD-10-CM

## 2024-08-19 DIAGNOSIS — I10 ESSENTIAL HYPERTENSION: ICD-10-CM

## 2024-08-19 LAB
ALBUMIN: 4.1 G/DL (ref 3.5–5.2)
ALP BLD-CCNC: 98 U/L (ref 35–104)
ALT SERPL-CCNC: 7 U/L (ref 0–32)
ANION GAP SERPL CALCULATED.3IONS-SCNC: 15 MMOL/L (ref 7–16)
AST SERPL-CCNC: 20 U/L (ref 0–31)
BACTERIA: ABNORMAL
BASOPHILS ABSOLUTE: 0.12 K/UL (ref 0–0.2)
BASOPHILS RELATIVE PERCENT: 1 % (ref 0–2)
BILIRUB SERPL-MCNC: 0.4 MG/DL (ref 0–1.2)
BILIRUBIN, URINE: NEGATIVE
BUN BLDV-MCNC: 13 MG/DL (ref 6–23)
CALCIUM SERPL-MCNC: 9.6 MG/DL (ref 8.6–10.2)
CHLORIDE BLD-SCNC: 103 MMOL/L (ref 98–107)
CHOLESTEROL, FASTING: 156 MG/DL
CO2: 22 MMOL/L (ref 22–29)
COLOR, UA: YELLOW
CREAT SERPL-MCNC: 0.6 MG/DL (ref 0.5–1)
EOSINOPHILS ABSOLUTE: 0.22 K/UL (ref 0.05–0.5)
EOSINOPHILS RELATIVE PERCENT: 3 % (ref 0–6)
EPITHELIAL CELLS, UA: ABNORMAL /HPF
GFR, ESTIMATED: >90 ML/MIN/1.73M2
GLUCOSE FASTING: 89 MG/DL (ref 74–99)
GLUCOSE URINE: NEGATIVE MG/DL
HCT VFR BLD CALC: 34.3 % (ref 34–48)
HDLC SERPL-MCNC: 55 MG/DL
HEMOGLOBIN: 10.5 G/DL (ref 11.5–15.5)
IMMATURE GRANULOCYTES %: 0 % (ref 0–5)
IMMATURE GRANULOCYTES ABSOLUTE: 0.03 K/UL (ref 0–0.58)
KETONES, URINE: NEGATIVE MG/DL
LDL CHOLESTEROL: 92 MG/DL
LEUKOCYTE ESTERASE, URINE: NEGATIVE
LYMPHOCYTES ABSOLUTE: 1.78 K/UL (ref 1.5–4)
LYMPHOCYTES RELATIVE PERCENT: 21 % (ref 20–42)
MCH RBC QN AUTO: 29.9 PG (ref 26–35)
MCHC RBC AUTO-ENTMCNC: 30.6 G/DL (ref 32–34.5)
MCV RBC AUTO: 97.7 FL (ref 80–99.9)
MONOCYTES ABSOLUTE: 0.87 K/UL (ref 0.1–0.95)
MONOCYTES RELATIVE PERCENT: 11 % (ref 2–12)
MUCUS: PRESENT
NEUTROPHILS ABSOLUTE: 5.3 K/UL (ref 1.8–7.3)
NEUTROPHILS RELATIVE PERCENT: 64 % (ref 43–80)
NITRITE, URINE: NEGATIVE
PDW BLD-RTO: 14.2 % (ref 11.5–15)
PH, URINE: 6 (ref 5–9)
PLATELET # BLD: 403 K/UL (ref 130–450)
PMV BLD AUTO: 9.8 FL (ref 7–12)
POTASSIUM SERPL-SCNC: 4.6 MMOL/L (ref 3.5–5)
PROTEIN UA: NEGATIVE MG/DL
RBC # BLD: 3.51 M/UL (ref 3.5–5.5)
RBC UA: ABNORMAL /HPF
SODIUM BLD-SCNC: 140 MMOL/L (ref 132–146)
SPECIFIC GRAVITY UA: 1.02 (ref 1–1.03)
TOTAL PROTEIN: 7.8 G/DL (ref 6.4–8.3)
TRIGLYCERIDE, FASTING: 47 MG/DL
TSH SERPL DL<=0.05 MIU/L-ACNC: 1.03 UIU/ML (ref 0.27–4.2)
TURBIDITY: CLEAR
URINE HGB: NEGATIVE
UROBILINOGEN, URINE: 0.2 EU/DL (ref 0–1)
VLDLC SERPL CALC-MCNC: 9 MG/DL
WBC # BLD: 8.3 K/UL (ref 4.5–11.5)
WBC UA: ABNORMAL /HPF

## 2024-08-19 SDOH — ECONOMIC STABILITY: INCOME INSECURITY: HOW HARD IS IT FOR YOU TO PAY FOR THE VERY BASICS LIKE FOOD, HOUSING, MEDICAL CARE, AND HEATING?: NOT HARD AT ALL

## 2024-08-19 SDOH — ECONOMIC STABILITY: FOOD INSECURITY: WITHIN THE PAST 12 MONTHS, THE FOOD YOU BOUGHT JUST DIDN'T LAST AND YOU DIDN'T HAVE MONEY TO GET MORE.: NEVER TRUE

## 2024-08-19 SDOH — ECONOMIC STABILITY: FOOD INSECURITY: WITHIN THE PAST 12 MONTHS, YOU WORRIED THAT YOUR FOOD WOULD RUN OUT BEFORE YOU GOT MONEY TO BUY MORE.: NEVER TRUE

## 2024-08-19 SDOH — HEALTH STABILITY: PHYSICAL HEALTH: ON AVERAGE, HOW MANY MINUTES DO YOU ENGAGE IN EXERCISE AT THIS LEVEL?: 30 MIN

## 2024-08-19 SDOH — HEALTH STABILITY: PHYSICAL HEALTH: ON AVERAGE, HOW MANY DAYS PER WEEK DO YOU ENGAGE IN MODERATE TO STRENUOUS EXERCISE (LIKE A BRISK WALK)?: 2 DAYS

## 2024-08-19 ASSESSMENT — PATIENT HEALTH QUESTIONNAIRE - PHQ9
9. THOUGHTS THAT YOU WOULD BE BETTER OFF DEAD, OR OF HURTING YOURSELF: NOT AT ALL
1. LITTLE INTEREST OR PLEASURE IN DOING THINGS: NOT AT ALL
8. MOVING OR SPEAKING SO SLOWLY THAT OTHER PEOPLE COULD HAVE NOTICED. OR THE OPPOSITE, BEING SO FIGETY OR RESTLESS THAT YOU HAVE BEEN MOVING AROUND A LOT MORE THAN USUAL: NOT AT ALL
SUM OF ALL RESPONSES TO PHQ QUESTIONS 1-9: 0
SUM OF ALL RESPONSES TO PHQ QUESTIONS 1-9: 0
5. POOR APPETITE OR OVEREATING: NOT AT ALL
SUM OF ALL RESPONSES TO PHQ QUESTIONS 1-9: 0
SUM OF ALL RESPONSES TO PHQ9 QUESTIONS 1 & 2: 0
4. FEELING TIRED OR HAVING LITTLE ENERGY: NOT AT ALL
2. FEELING DOWN, DEPRESSED OR HOPELESS: NOT AT ALL
3. TROUBLE FALLING OR STAYING ASLEEP: NOT AT ALL
SUM OF ALL RESPONSES TO PHQ QUESTIONS 1-9: 0
6. FEELING BAD ABOUT YOURSELF - OR THAT YOU ARE A FAILURE OR HAVE LET YOURSELF OR YOUR FAMILY DOWN: NOT AT ALL
10. IF YOU CHECKED OFF ANY PROBLEMS, HOW DIFFICULT HAVE THESE PROBLEMS MADE IT FOR YOU TO DO YOUR WORK, TAKE CARE OF THINGS AT HOME, OR GET ALONG WITH OTHER PEOPLE: NOT DIFFICULT AT ALL
7. TROUBLE CONCENTRATING ON THINGS, SUCH AS READING THE NEWSPAPER OR WATCHING TELEVISION: NOT AT ALL

## 2024-08-19 ASSESSMENT — LIFESTYLE VARIABLES
HOW MANY STANDARD DRINKS CONTAINING ALCOHOL DO YOU HAVE ON A TYPICAL DAY: 0
HOW OFTEN DO YOU HAVE A DRINK CONTAINING ALCOHOL: NEVER
HOW OFTEN DO YOU HAVE SIX OR MORE DRINKS ON ONE OCCASION: 1
HOW MANY STANDARD DRINKS CONTAINING ALCOHOL DO YOU HAVE ON A TYPICAL DAY: PATIENT DOES NOT DRINK
HOW OFTEN DO YOU HAVE A DRINK CONTAINING ALCOHOL: 1

## 2024-08-21 ENCOUNTER — OFFICE VISIT (OUTPATIENT)
Dept: FAMILY MEDICINE CLINIC | Age: 74
End: 2024-08-21

## 2024-08-21 VITALS
DIASTOLIC BLOOD PRESSURE: 78 MMHG | HEART RATE: 71 BPM | TEMPERATURE: 97.8 F | OXYGEN SATURATION: 95 % | HEIGHT: 66 IN | SYSTOLIC BLOOD PRESSURE: 124 MMHG | BODY MASS INDEX: 29.3 KG/M2 | WEIGHT: 182.3 LBS

## 2024-08-21 DIAGNOSIS — D64.9 ANEMIA, UNSPECIFIED TYPE: ICD-10-CM

## 2024-08-21 DIAGNOSIS — Z00.00 MEDICARE ANNUAL WELLNESS VISIT, SUBSEQUENT: Primary | ICD-10-CM

## 2024-08-21 SDOH — ECONOMIC STABILITY: FOOD INSECURITY: WITHIN THE PAST 12 MONTHS, THE FOOD YOU BOUGHT JUST DIDN'T LAST AND YOU DIDN'T HAVE MONEY TO GET MORE.: NEVER TRUE

## 2024-08-21 SDOH — ECONOMIC STABILITY: FOOD INSECURITY: WITHIN THE PAST 12 MONTHS, YOU WORRIED THAT YOUR FOOD WOULD RUN OUT BEFORE YOU GOT MONEY TO BUY MORE.: NEVER TRUE

## 2024-08-21 SDOH — ECONOMIC STABILITY: INCOME INSECURITY: HOW HARD IS IT FOR YOU TO PAY FOR THE VERY BASICS LIKE FOOD, HOUSING, MEDICAL CARE, AND HEATING?: NOT HARD AT ALL

## 2024-08-21 NOTE — PROGRESS NOTES
Medicare Annual Wellness Visit    Nella White is here for Medicare AWV, Discuss Labs (Completed on 08/19/2024 ), and Health Maintenance (Colonoscopy - due, DTaP,  )    Assessment & Plan   Anemia, unspecified type  -     Ferritin; Future  -     Iron and TIBC; Future    Recommendations for Preventive Services Due: see orders and patient instructions/AVS.  Recommended screening schedule for the next 5-10 years is provided to the patient in written form: see Patient Instructions/AVS.     No follow-ups on file.     Subjective   Feeling well  Following with Oncology in [facility]  Due for colonoscopy     Patient's complete Health Risk Assessment and screening values have been reviewed and are found in Flowsheets. The following problems were reviewed today and where indicated follow up appointments were made and/or referrals ordered.    Positive Risk Factor Screenings with Interventions:                Inactivity:  On average, how many days per week do you engage in moderate to strenuous exercise (like a brisk walk)?: 2 days (!) Abnormal  On average, how many minutes do you engage in exercise at this level?: 30 min    Interventions:  See AVS for additional education material                         Objective   Vitals:    08/21/24 1432   BP: 124/78   Position: Sitting   Pulse: 71   Temp: 97.8 °F (36.6 °C)   TempSrc: Temporal   SpO2: 95%   Weight: 82.7 kg (182 lb 4.8 oz)   Height: 1.676 m (5' 6\")      Body mass index is 29.42 kg/m².                    Allergies   Allergen Reactions    Atorvastatin      Other reaction(s): SEVERE MUSCLE (LEG) CRAMPS    Sulfa Antibiotics      Other reaction(s): Adverse drug reaction, Unknown  Other reaction(s): URINARY REACTION AS TEENAGER    Celexa [Citalopram]      GI issues         Lipitor [Atorvastatin Calcium]      Leg pains        Prior to Visit Medications    Medication Sig Taking? Authorizing Provider   azelastine (ASTELIN) 0.1 % nasal spray 1 spray by Nasal route 2 times daily as needed

## 2024-08-21 NOTE — PATIENT INSTRUCTIONS
Learning About Being Active as an Older Adult  Why is being active important as you get older?     Being active is one of the best things you can do for your health. And it's never too late to start. Being active--or getting active, if you aren't already--has definite benefits. It can:  Give you more energy,  Keep your mind sharp.  Improve balance to reduce your risk of falls.  Help you manage chronic illness with fewer medicines.  No matter how old you are, how fit you are, or what health problems you have, there is a form of activity that will work for you. And the more physical activity you can do, the better your overall health will be.  What kinds of activity can help you stay healthy?  Being more active will make your daily activities easier. Physical activity includes planned exercise and things you do in daily life. There are four types of activity:  Aerobic.  Doing aerobic activity makes your heart and lungs strong.  Includes walking, dancing, and gardening.  Aim for at least 2½ hours spread throughout the week.  It improves your energy and can help you sleep better.  Muscle-strengthening.  This type of activity can help maintain muscle and strengthen bones.  Includes climbing stairs, using resistance bands, and lifting or carrying heavy loads.  Aim for at least twice a week.  It can help protect the knees and other joints.  Stretching.  Stretching gives you better range of motion in joints and muscles.  Includes upper arm stretches, calf stretches, and gentle yoga.  Aim for at least twice a week, preferably after your muscles are warmed up from other activities.  It can help you function better in daily life.  Balancing.  This helps you stay coordinated and have good posture.  Includes heel-to-toe walking, edward chi, and certain types of yoga.  Aim for at least 3 days a week.  It can reduce your risk of falling.  Even if you have a hard time meeting the recommendations, it's better to be more active

## 2024-08-22 DIAGNOSIS — D64.9 ANEMIA, UNSPECIFIED TYPE: ICD-10-CM

## 2024-08-22 LAB
FERRITIN: 146 NG/ML
IRON % SATURATION: 19 % (ref 15–50)
IRON: 52 UG/DL (ref 37–145)
TOTAL IRON BINDING CAPACITY: 269 UG/DL (ref 250–450)

## 2024-10-13 ENCOUNTER — PATIENT MESSAGE (OUTPATIENT)
Dept: FAMILY MEDICINE CLINIC | Age: 74
End: 2024-10-13

## 2024-10-13 DIAGNOSIS — I10 ESSENTIAL HYPERTENSION: ICD-10-CM

## 2024-10-14 RX ORDER — LOSARTAN POTASSIUM 50 MG/1
50 TABLET ORAL DAILY
Qty: 90 TABLET | Refills: 1 | Status: SHIPPED | OUTPATIENT
Start: 2024-10-14

## 2024-10-21 LAB
ERYTHROCYTE [DISTWIDTH] IN BLOOD BY AUTOMATED COUNT: 14.8 % (ref 11.5–15)
FOLATE SERPL-MCNC: 17.2 NG/ML (ref 4.8–24.2)
HAPTOGLOB SERPL-MCNC: 394 MG/DL (ref 30–200)
HCT VFR BLD AUTO: 31.9 % (ref 34–48)
HGB BLD-MCNC: 9.6 G/DL (ref 11.5–15.5)
IMM RETICS NFR: 13.9 % (ref 3–15.9)
MCH RBC QN AUTO: 29 PG (ref 26–35)
MCHC RBC AUTO-ENTMCNC: 30.1 G/DL (ref 32–34.5)
MCV RBC AUTO: 96.4 FL (ref 80–99.9)
PLATELET, FLUORESCENCE: 418 K/UL (ref 130–450)
PMV BLD AUTO: 9.1 FL (ref 7–12)
RBC # BLD AUTO: 3.31 M/UL (ref 3.5–5.5)
RETIC HEMOGLOBIN: 31.5 PG (ref 28.2–36.6)
RETICS # AUTO: 0.05 M/UL
RETICS/RBC NFR AUTO: 1.5 % (ref 0.4–1.9)
VIT B12 SERPL-MCNC: >2000 PG/ML (ref 211–946)
WBC OTHER # BLD: 7.9 K/UL (ref 4.5–11.5)

## 2024-10-30 ENCOUNTER — OFFICE VISIT (OUTPATIENT)
Dept: FAMILY MEDICINE CLINIC | Age: 74
End: 2024-10-30

## 2024-10-30 VITALS
OXYGEN SATURATION: 99 % | RESPIRATION RATE: 19 BRPM | SYSTOLIC BLOOD PRESSURE: 133 MMHG | BODY MASS INDEX: 28.56 KG/M2 | DIASTOLIC BLOOD PRESSURE: 84 MMHG | HEART RATE: 93 BPM | HEIGHT: 67 IN | TEMPERATURE: 97.6 F | WEIGHT: 182 LBS

## 2024-10-30 DIAGNOSIS — J01.00 ACUTE NON-RECURRENT MAXILLARY SINUSITIS: Primary | ICD-10-CM

## 2024-10-30 RX ORDER — BENZONATATE 200 MG/1
200 CAPSULE ORAL 3 TIMES DAILY PRN
Qty: 30 CAPSULE | Refills: 0 | Status: SHIPPED | OUTPATIENT
Start: 2024-10-30

## 2024-10-30 NOTE — PROGRESS NOTES
10/30/24  Nella White : 1950 Sex: female  Age 74 y.o.    Subjective:  Chief Complaint   Patient presents with    Cough     Chest congestion, sob, started last tuesday       HPI:   Nella White , 74 y.o. female presents to the clinic for evaluation of cough x 8 days. The patient also reports chest congestion and SOB when coughing. The patient has taken Sudafed and Mucinex for symptoms. The patient reports no change in symptoms over time. The patient denies ill exposure. The patient denies acute loss of taste and smell, headache, sinus congestion, sore throat, rash, and fever. The patient also denies chest pain, abdominal pain, shortness of breath, wheezing, and nausea / vomiting / diarrhea.    ROS:   Unless otherwise stated in this report the patient's positive and negative responses for review of systems for constitutional, eyes, ENT, cardiovascular, respiratory, gastrointestinal, neurological, , musculoskeletal, and integument systems and related systems to the presenting problem are either stated in the history of present illness or were not pertinent or were negative for the symptoms and/or complaints related to the presenting medical problem.  Positives and pertinent negatives as per HPI.  All others reviewed and are negative.      PMH:     Past Medical History:   Diagnosis Date    Anxiety     Breast cancer (HCC) 2013    right side breast cancer     Breast cancer (HCC) 2021    left side breast cancer     CAD (coronary artery disease)     Congenital hearing loss     Depression     seeing Dr. Bennett. doing better     GERD (gastroesophageal reflux disease)     Hyperlipidemia     Hypertension     Hypothyroidism     Osteopenia     Rosacea     Thyroid disease        Past Surgical History:   Procedure Laterality Date    BREAST SURGERY      Rt.Mastectomy    CARDIAC SURGERY      CHOLECYSTECTOMY  2017    COLONOSCOPY      ENDOSCOPY, COLON, DIAGNOSTIC      FRACTURE SURGERY Left     wrist

## 2024-11-01 ENCOUNTER — TELEPHONE (OUTPATIENT)
Dept: FAMILY MEDICINE CLINIC | Age: 74
End: 2024-11-01

## 2024-11-01 NOTE — TELEPHONE ENCOUNTER
Left voicemail message I was returning pt's call.    ----- Message from Mitul NELIDA sent at 11/1/2024  1:57 PM EDT -----  Regarding: ECC Appointment Request  ECC Appointment Request    Patient needs appointment for ECC Appointment Type: Existing Condition Follow Up.    Patient Requested Dates(s):After December 3  Patient Requested Time: At anytime  Provider Name: Srinath Garcia     Reason for Appointment Request: Established Patient - Available appointments did not meet patient need. PT want to reschedule the appt for 11/26/24 because her appt for colonoscopy is on December 3rd  --------------------------------------------------------------------------------------------------------------------------    Relationship to Patient: Self     Call Back Information: OK to leave message on voicemail  Preferred Call Back Number: Phone [phone]

## 2024-11-18 ENCOUNTER — OFFICE VISIT (OUTPATIENT)
Dept: FAMILY MEDICINE CLINIC | Age: 74
End: 2024-11-18

## 2024-11-18 VITALS
RESPIRATION RATE: 19 BRPM | DIASTOLIC BLOOD PRESSURE: 75 MMHG | HEIGHT: 66 IN | HEART RATE: 95 BPM | SYSTOLIC BLOOD PRESSURE: 124 MMHG | WEIGHT: 182 LBS | BODY MASS INDEX: 29.25 KG/M2 | TEMPERATURE: 97.4 F | OXYGEN SATURATION: 98 %

## 2024-11-18 DIAGNOSIS — R05.1 ACUTE COUGH: Primary | ICD-10-CM

## 2024-11-18 DIAGNOSIS — R09.89 CHEST CONGESTION: ICD-10-CM

## 2024-11-18 RX ORDER — BROMPHENIRAMINE MALEATE, PSEUDOEPHEDRINE HYDROCHLORIDE, AND DEXTROMETHORPHAN HYDROBROMIDE 2; 30; 10 MG/5ML; MG/5ML; MG/5ML
SYRUP ORAL
Qty: 180 ML | Refills: 0 | Status: SHIPPED | OUTPATIENT
Start: 2024-11-18

## 2024-11-18 NOTE — PROGRESS NOTES
24  Nella White : 1950 Sex: female  Age 74 y.o.    Subjective:  Chief Complaint   Patient presents with    Cough     Chest congestion, sinus drainage and congestion for a few weeks       HPI:   Nella White , 74 y.o. female presents to the clinic for evaluation of cough x 2 weeks. The patient also reports chest congestion, sinus congestion and drainage. The patient has taken Mucinex, Sudafed and tessalon pearls for symptoms. The patient reports no change in symptoms over time. The patient denies ill exposure. The patient denies acute loss of taste and smell, headache, sore throat, rash, and fever. The patient also denies chest pain, abdominal pain, shortness of breath, wheezing, and nausea / vomiting / diarrhea.    ROS:   Unless otherwise stated in this report the patient's positive and negative responses for review of systems for constitutional, eyes, ENT, cardiovascular, respiratory, gastrointestinal, neurological, , musculoskeletal, and integument systems and related systems to the presenting problem are either stated in the history of present illness or were not pertinent or were negative for the symptoms and/or complaints related to the presenting medical problem.  Positives and pertinent negatives as per HPI.  All others reviewed and are negative.      PMH:     Past Medical History:   Diagnosis Date    Anxiety     Breast cancer (HCC) 2013    right side breast cancer     Breast cancer (HCC) 2021    left side breast cancer     CAD (coronary artery disease)     Congenital hearing loss     Depression     seeing Dr. Bennett. doing better     GERD (gastroesophageal reflux disease)     Hyperlipidemia     Hypertension     Hypothyroidism     Osteopenia     Rosacea     Thyroid disease        Past Surgical History:   Procedure Laterality Date    BREAST SURGERY      Rt.Mastectomy    CARDIAC SURGERY      CHOLECYSTECTOMY  2017    COLONOSCOPY      ENDOSCOPY, COLON, DIAGNOSTIC

## 2025-01-06 ENCOUNTER — OFFICE VISIT (OUTPATIENT)
Dept: FAMILY MEDICINE CLINIC | Age: 75
End: 2025-01-06
Payer: MEDICARE

## 2025-01-06 VITALS
HEIGHT: 65 IN | SYSTOLIC BLOOD PRESSURE: 132 MMHG | TEMPERATURE: 98.2 F | DIASTOLIC BLOOD PRESSURE: 84 MMHG | BODY MASS INDEX: 28.12 KG/M2 | OXYGEN SATURATION: 94 % | HEART RATE: 107 BPM | WEIGHT: 168.8 LBS

## 2025-01-06 DIAGNOSIS — R05.3 CHRONIC COUGH: Primary | ICD-10-CM

## 2025-01-06 DIAGNOSIS — J32.8 OTHER CHRONIC SINUSITIS: ICD-10-CM

## 2025-01-06 PROCEDURE — 1159F MED LIST DOCD IN RCRD: CPT | Performed by: INTERNAL MEDICINE

## 2025-01-06 PROCEDURE — 1123F ACP DISCUSS/DSCN MKR DOCD: CPT | Performed by: INTERNAL MEDICINE

## 2025-01-06 PROCEDURE — 99214 OFFICE O/P EST MOD 30 MIN: CPT | Performed by: INTERNAL MEDICINE

## 2025-01-06 PROCEDURE — 1036F TOBACCO NON-USER: CPT | Performed by: INTERNAL MEDICINE

## 2025-01-06 PROCEDURE — G8427 DOCREV CUR MEDS BY ELIG CLIN: HCPCS | Performed by: INTERNAL MEDICINE

## 2025-01-06 PROCEDURE — M1299 PR FLU IMMUNIZE ORDER/ADMIN: HCPCS | Performed by: INTERNAL MEDICINE

## 2025-01-06 PROCEDURE — 3017F COLORECTAL CA SCREEN DOC REV: CPT | Performed by: INTERNAL MEDICINE

## 2025-01-06 PROCEDURE — 3075F SYST BP GE 130 - 139MM HG: CPT | Performed by: INTERNAL MEDICINE

## 2025-01-06 PROCEDURE — G8417 CALC BMI ABV UP PARAM F/U: HCPCS | Performed by: INTERNAL MEDICINE

## 2025-01-06 PROCEDURE — 1090F PRES/ABSN URINE INCON ASSESS: CPT | Performed by: INTERNAL MEDICINE

## 2025-01-06 PROCEDURE — G8399 PT W/DXA RESULTS DOCUMENT: HCPCS | Performed by: INTERNAL MEDICINE

## 2025-01-06 PROCEDURE — 3079F DIAST BP 80-89 MM HG: CPT | Performed by: INTERNAL MEDICINE

## 2025-01-06 RX ORDER — METHYLPREDNISOLONE 4 MG/1
TABLET ORAL
Qty: 1 KIT | Refills: 0 | Status: SHIPPED | OUTPATIENT
Start: 2025-01-06

## 2025-01-06 RX ORDER — AZITHROMYCIN 250 MG/1
TABLET, FILM COATED ORAL
Qty: 1 PACKET | Refills: 0 | Status: SHIPPED | OUTPATIENT
Start: 2025-01-06

## 2025-01-06 ASSESSMENT — PATIENT HEALTH QUESTIONNAIRE - PHQ9
SUM OF ALL RESPONSES TO PHQ QUESTIONS 1-9: 16
1. LITTLE INTEREST OR PLEASURE IN DOING THINGS: SEVERAL DAYS
7. TROUBLE CONCENTRATING ON THINGS, SUCH AS READING THE NEWSPAPER OR WATCHING TELEVISION: NOT AT ALL
SUM OF ALL RESPONSES TO PHQ QUESTIONS 1-9: 16
4. FEELING TIRED OR HAVING LITTLE ENERGY: NEARLY EVERY DAY
2. FEELING DOWN, DEPRESSED OR HOPELESS: NEARLY EVERY DAY
SUM OF ALL RESPONSES TO PHQ QUESTIONS 1-9: 16
SUM OF ALL RESPONSES TO PHQ QUESTIONS 1-9: 16
5. POOR APPETITE OR OVEREATING: NEARLY EVERY DAY
3. TROUBLE FALLING OR STAYING ASLEEP: NEARLY EVERY DAY
9. THOUGHTS THAT YOU WOULD BE BETTER OFF DEAD, OR OF HURTING YOURSELF: NOT AT ALL
6. FEELING BAD ABOUT YOURSELF - OR THAT YOU ARE A FAILURE OR HAVE LET YOURSELF OR YOUR FAMILY DOWN: NEARLY EVERY DAY
8. MOVING OR SPEAKING SO SLOWLY THAT OTHER PEOPLE COULD HAVE NOTICED. OR THE OPPOSITE, BEING SO FIGETY OR RESTLESS THAT YOU HAVE BEEN MOVING AROUND A LOT MORE THAN USUAL: NOT AT ALL
SUM OF ALL RESPONSES TO PHQ9 QUESTIONS 1 & 2: 4
10. IF YOU CHECKED OFF ANY PROBLEMS, HOW DIFFICULT HAVE THESE PROBLEMS MADE IT FOR YOU TO DO YOUR WORK, TAKE CARE OF THINGS AT HOME, OR GET ALONG WITH OTHER PEOPLE: NOT DIFFICULT AT ALL

## 2025-01-06 ASSESSMENT — ENCOUNTER SYMPTOMS
COUGH: 1
SINUS PRESSURE: 1

## 2025-01-06 NOTE — PROGRESS NOTES
light.   Neck:      Vascular: No JVD.   Cardiovascular:      Rate and Rhythm: Normal rate and regular rhythm.   Pulmonary:      Effort: Pulmonary effort is normal.      Breath sounds: Normal breath sounds.   Abdominal:      General: Bowel sounds are normal.      Palpations: Abdomen is soft.   Musculoskeletal:         General: Normal range of motion.   Skin:     General: Skin is warm and dry.   Neurological:      Mental Status: She is alert and oriented to person, place, and time.   Psychiatric:         Behavior: Behavior normal.          Labs :    Lab Results   Component Value Date    WBC 7.9 10/21/2024    HGB 9.6 (L) 10/21/2024    HCT 31.9 (L) 10/21/2024     08/19/2024    CHOL 178 07/26/2021    TRIG 60 07/26/2021    HDL 55 08/19/2024    ALT 7 08/19/2024    AST 20 08/19/2024     08/19/2024    K 4.6 08/19/2024     08/19/2024    CREATININE 0.6 08/19/2024    BUN 13 08/19/2024    CO2 22 08/19/2024    TSH 1.03 08/19/2024    INR 1.1 02/23/2011    GLUF 89 08/19/2024    LABA1C 5.3 07/26/2021     Lab Results   Component Value Date/Time    COLORU Yellow 08/19/2024 08:19 AM    NITRU NEGATIVE 08/19/2024 08:19 AM    GLUCOSEU NEGATIVE 08/19/2024 08:19 AM    KETUA NEGATIVE 08/19/2024 08:19 AM    UROBILINOGEN 0.2 08/19/2024 08:19 AM    BILIRUBINUR NEGATIVE 08/19/2024 08:19 AM           ASSESSMENT     Patient Active Problem List    Diagnosis Date Noted    History of left breast cancer 10/06/2022     Priority: Medium     Overview Note:      S/p Lumpectomy, XRT abd Chemo --[facility] Oct 2021          History of right breast cancer- 2013  10/06/2022     Priority: Medium    Establishing care with new doctor, encounter for 03/28/2022    Hypothyroidism 07/30/2021    Hyperlipidemia 07/30/2021    Essential hypertension 07/30/2021    Depression with anxiety 07/30/2021    History of pulmonary embolism 07/30/2021        Diagnosis:   1. Chronic cough  -     XR CHEST STANDARD (2 VW); Future  2. Other chronic sinusitis  -     CT

## 2025-01-07 RX ORDER — BROMPHENIRAMINE MALEATE, PSEUDOEPHEDRINE HYDROCHLORIDE, AND DEXTROMETHORPHAN HYDROBROMIDE 2; 30; 10 MG/5ML; MG/5ML; MG/5ML
SYRUP ORAL
Qty: 180 ML | Refills: 0 | OUTPATIENT
Start: 2025-01-07

## 2025-01-10 ENCOUNTER — HOSPITAL ENCOUNTER (OUTPATIENT)
Dept: CT IMAGING | Age: 75
Discharge: HOME | End: 2025-01-10
Attending: INTERNAL MEDICINE
Payer: MEDICARE

## 2025-01-10 DIAGNOSIS — E78.5 HYPERLIPIDEMIA, UNSPECIFIED HYPERLIPIDEMIA TYPE: ICD-10-CM

## 2025-01-10 DIAGNOSIS — J32.8 OTHER CHRONIC SINUSITIS: ICD-10-CM

## 2025-01-10 PROCEDURE — 70486 CT MAXILLOFACIAL W/O DYE: CPT

## 2025-01-10 RX ORDER — PRAVASTATIN SODIUM 40 MG
40 TABLET ORAL DAILY
Qty: 90 TABLET | Refills: 0 | OUTPATIENT
Start: 2025-01-10

## 2025-01-13 RX ORDER — PRAVASTATIN SODIUM 40 MG
40 TABLET ORAL DAILY
Qty: 90 TABLET | Refills: 1 | Status: SHIPPED | OUTPATIENT
Start: 2025-01-13

## 2025-01-13 NOTE — TELEPHONE ENCOUNTER
Name of Medication(s) Requested:  Requested Prescriptions     Pending Prescriptions Disp Refills    pravastatin (PRAVACHOL) 40 MG tablet 90 tablet 1     Sig: Take 1 tablet by mouth daily       Medication is on current medication list Yes    Dosage and directions were verified? Yes    Quantity verified: 90 day supply     Pharmacy Verified?  Yes    Last Appointment:  1/6/2025    Future appts:  Future Appointments   Date Time Provider Department Center   1/22/2025  4:30 PM Srinath Tolbert MD [facility]   8/26/2025 10:00 AM Srinath Tolbert MD [facility]        (If no appt send self scheduling link. .REFILLAPPT)  Scheduling request sent?     [] Yes  [x] No    Does patient need updated?  [] Yes  [x] No

## 2025-01-19 SDOH — ECONOMIC STABILITY: FOOD INSECURITY: WITHIN THE PAST 12 MONTHS, YOU WORRIED THAT YOUR FOOD WOULD RUN OUT BEFORE YOU GOT MONEY TO BUY MORE.: NEVER TRUE

## 2025-01-19 SDOH — ECONOMIC STABILITY: INCOME INSECURITY: IN THE LAST 12 MONTHS, WAS THERE A TIME WHEN YOU WERE NOT ABLE TO PAY THE MORTGAGE OR RENT ON TIME?: NO

## 2025-01-19 SDOH — ECONOMIC STABILITY: FOOD INSECURITY: WITHIN THE PAST 12 MONTHS, THE FOOD YOU BOUGHT JUST DIDN'T LAST AND YOU DIDN'T HAVE MONEY TO GET MORE.: NEVER TRUE

## 2025-01-22 ENCOUNTER — OFFICE VISIT (OUTPATIENT)
Dept: FAMILY MEDICINE CLINIC | Age: 75
End: 2025-01-22

## 2025-01-22 VITALS
DIASTOLIC BLOOD PRESSURE: 74 MMHG | OXYGEN SATURATION: 95 % | WEIGHT: 169.3 LBS | TEMPERATURE: 97.9 F | BODY MASS INDEX: 28.21 KG/M2 | SYSTOLIC BLOOD PRESSURE: 122 MMHG | HEART RATE: 77 BPM | HEIGHT: 65 IN

## 2025-01-22 DIAGNOSIS — R05.3 CHRONIC COUGH: Primary | ICD-10-CM

## 2025-01-22 DIAGNOSIS — R09.82 POST-NASAL DRIP: ICD-10-CM

## 2025-01-22 RX ORDER — AZELASTINE 1 MG/ML
1 SPRAY, METERED NASAL 2 TIMES DAILY PRN
Qty: 3 EACH | Refills: 0 | Status: SHIPPED | OUTPATIENT
Start: 2025-01-22

## 2025-01-22 RX ORDER — FERROUS SULFATE 325(65) MG
325 TABLET, DELAYED RELEASE (ENTERIC COATED) ORAL
COMMUNITY

## 2025-01-22 ASSESSMENT — ENCOUNTER SYMPTOMS
SORE THROAT: 0
NAUSEA: 0
SHORTNESS OF BREATH: 0
EYE PAIN: 0
SINUS PAIN: 0
BLOOD IN STOOL: 0
ABDOMINAL PAIN: 0
EYE DISCHARGE: 0

## 2025-01-22 NOTE — PROGRESS NOTES
Please see doctor's response and correct order. Patient requesting call back. motion.   Skin:     General: Skin is warm and dry.   Neurological:      Mental Status: She is alert and oriented to person, place, and time.   Psychiatric:         Behavior: Behavior normal.          Labs :    Lab Results   Component Value Date    WBC 7.9 10/21/2024    HGB 9.6 (L) 10/21/2024    HCT 31.9 (L) 10/21/2024     08/19/2024    CHOL 178 07/26/2021    TRIG 60 07/26/2021    HDL 55 08/19/2024    ALT 7 08/19/2024    AST 20 08/19/2024     08/19/2024    K 4.6 08/19/2024     08/19/2024    CREATININE 0.6 08/19/2024    BUN 13 08/19/2024    CO2 22 08/19/2024    TSH 1.03 08/19/2024    INR 1.1 02/23/2011    GLUF 89 08/19/2024    LABA1C 5.3 07/26/2021     Lab Results   Component Value Date/Time    COLORU Yellow 08/19/2024 08:19 AM    NITRU NEGATIVE 08/19/2024 08:19 AM    GLUCOSEU NEGATIVE 08/19/2024 08:19 AM    KETUA NEGATIVE 08/19/2024 08:19 AM    UROBILINOGEN 0.2 08/19/2024 08:19 AM    BILIRUBINUR NEGATIVE 08/19/2024 08:19 AM     No results found for: \"PSA\", \"CEA\", \"\", \"EA4149\", \"\"          ASSESSMENT     Patient Active Problem List    Diagnosis Date Noted    History of left breast cancer 10/06/2022     Priority: Medium     Overview Note:      S/p Lumpectomy, XRT abd Chemo --[facility] Oct 2021          History of right breast cancer- 2013  10/06/2022     Priority: Medium    Establishing care with new doctor, encounter for 03/28/2022    Hypothyroidism 07/30/2021    Hyperlipidemia 07/30/2021    Essential hypertension 07/30/2021    Depression with anxiety 07/30/2021    History of pulmonary embolism 07/30/2021        Diagnosis:   1. Chronic cough  -     CT CHEST WO CONTRAST; Future  -     External Referral To Allergy  2. Post-nasal drip  -     azelastine (ASTELIN) 0.1 % nasal spray; 1 spray by Nasal route 2 times daily as needed for Rhinitis, Disp-3 each, R-0Normal  -     External Referral To Allergy         PLAN:     Will get CT chest - No contrast    Ref to Allergist    Pt is stable on current

## 2025-02-02 DIAGNOSIS — R09.82 POST-NASAL DRIP: ICD-10-CM

## 2025-02-03 RX ORDER — AZELASTINE 1 MG/ML
1 SPRAY, METERED NASAL 2 TIMES DAILY PRN
Qty: 3 EACH | Refills: 0 | Status: SHIPPED | OUTPATIENT
Start: 2025-02-03

## 2025-02-17 ENCOUNTER — HOSPITAL ENCOUNTER (OUTPATIENT)
Dept: CT IMAGING | Age: 75
Discharge: HOME | End: 2025-02-17
Attending: INTERNAL MEDICINE
Payer: MEDICARE

## 2025-02-17 DIAGNOSIS — R05.3 CHRONIC COUGH: ICD-10-CM

## 2025-02-17 PROCEDURE — 71250 CT THORAX DX C-: CPT

## 2025-02-28 ENCOUNTER — HOSPITAL ENCOUNTER (OUTPATIENT)
Age: 75
Setting detail: OBSERVATION
Discharge: HOME | End: 2025-03-01
Attending: EMERGENCY MEDICINE | Admitting: INTERNAL MEDICINE
Payer: MEDICARE

## 2025-02-28 ENCOUNTER — APPOINTMENT (OUTPATIENT)
Dept: CT IMAGING | Age: 75
End: 2025-02-28
Payer: MEDICARE

## 2025-02-28 DIAGNOSIS — J44.9 COPD SUGGESTED BY INITIAL EVALUATION (HCC): ICD-10-CM

## 2025-02-28 DIAGNOSIS — R09.02 HYPOXIA: ICD-10-CM

## 2025-02-28 DIAGNOSIS — R05.9 COUGH, UNSPECIFIED TYPE: Primary | ICD-10-CM

## 2025-02-28 LAB
ALBUMIN SERPL-MCNC: 3.4 G/DL (ref 3.5–5.2)
ALP SERPL-CCNC: 84 U/L (ref 35–104)
ALT SERPL-CCNC: 7 U/L (ref 0–32)
ANION GAP SERPL CALCULATED.3IONS-SCNC: 11 MMOL/L (ref 7–16)
AST SERPL-CCNC: 13 U/L (ref 0–31)
BASOPHILS # BLD: 0.08 K/UL (ref 0–0.2)
BASOPHILS NFR BLD: 1 % (ref 0–2)
BILIRUB SERPL-MCNC: 0.3 MG/DL (ref 0–1.2)
BUN SERPL-MCNC: 9 MG/DL (ref 6–23)
CALCIUM SERPL-MCNC: 9.7 MG/DL (ref 8.6–10.2)
CHLORIDE SERPL-SCNC: 106 MMOL/L (ref 98–107)
CO2 SERPL-SCNC: 26 MMOL/L (ref 22–29)
CREAT SERPL-MCNC: 0.5 MG/DL (ref 0.5–1)
EOSINOPHIL # BLD: 0.09 K/UL (ref 0.05–0.5)
EOSINOPHILS RELATIVE PERCENT: 1 % (ref 0–6)
ERYTHROCYTE [DISTWIDTH] IN BLOOD BY AUTOMATED COUNT: 15.9 % (ref 11.5–15)
FLUAV RNA RESP QL NAA+PROBE: NOT DETECTED
FLUBV RNA RESP QL NAA+PROBE: NOT DETECTED
GFR, ESTIMATED: >90 ML/MIN/1.73M2
GLUCOSE SERPL-MCNC: 111 MG/DL (ref 74–99)
HCT VFR BLD AUTO: 29.4 % (ref 34–48)
HGB BLD-MCNC: 9.2 G/DL (ref 11.5–15.5)
IMM GRANULOCYTES # BLD AUTO: 0.04 K/UL (ref 0–0.58)
IMM GRANULOCYTES NFR BLD: 0 % (ref 0–5)
LYMPHOCYTES NFR BLD: 2.08 K/UL (ref 1.5–4)
LYMPHOCYTES RELATIVE PERCENT: 23 % (ref 20–42)
MCH RBC QN AUTO: 28 PG (ref 26–35)
MCHC RBC AUTO-ENTMCNC: 31.3 G/DL (ref 32–34.5)
MCV RBC AUTO: 89.6 FL (ref 80–99.9)
MONOCYTES NFR BLD: 0.75 K/UL (ref 0.1–0.95)
MONOCYTES NFR BLD: 8 % (ref 2–12)
NEUTROPHILS NFR BLD: 66 % (ref 43–80)
NEUTS SEG NFR BLD: 5.96 K/UL (ref 1.8–7.3)
PLATELET # BLD AUTO: 523 K/UL (ref 130–450)
PMV BLD AUTO: 8.7 FL (ref 7–12)
POTASSIUM SERPL-SCNC: 4 MMOL/L (ref 3.5–5)
PROT SERPL-MCNC: 7.8 G/DL (ref 6.4–8.3)
RBC # BLD AUTO: 3.28 M/UL (ref 3.5–5.5)
SARS-COV-2 RNA RESP QL NAA+PROBE: NOT DETECTED
SODIUM SERPL-SCNC: 143 MMOL/L (ref 132–146)
SOURCE: NORMAL
SPECIMEN DESCRIPTION: NORMAL
TROPONIN I SERPL HS-MCNC: <6 NG/L (ref 0–9)
TSH SERPL DL<=0.05 MIU/L-ACNC: 0.42 UIU/ML (ref 0.27–4.2)
WBC OTHER # BLD: 9 K/UL (ref 4.5–11.5)

## 2025-02-28 PROCEDURE — 6370000000 HC RX 637 (ALT 250 FOR IP): Performed by: NURSE PRACTITIONER

## 2025-02-28 PROCEDURE — 80053 COMPREHEN METABOLIC PANEL: CPT

## 2025-02-28 PROCEDURE — 2580000003 HC RX 258

## 2025-02-28 PROCEDURE — APPSS45 APP SPLIT SHARED TIME 31-45 MINUTES: Performed by: NURSE PRACTITIONER

## 2025-02-28 PROCEDURE — 84466 ASSAY OF TRANSFERRIN: CPT

## 2025-02-28 PROCEDURE — 6360000004 HC RX CONTRAST MEDICATION: Performed by: RADIOLOGY

## 2025-02-28 PROCEDURE — 82728 ASSAY OF FERRITIN: CPT

## 2025-02-28 PROCEDURE — 99285 EMERGENCY DEPT VISIT HI MDM: CPT

## 2025-02-28 PROCEDURE — 96361 HYDRATE IV INFUSION ADD-ON: CPT

## 2025-02-28 PROCEDURE — 96366 THER/PROPH/DIAG IV INF ADDON: CPT

## 2025-02-28 PROCEDURE — 96360 HYDRATION IV INFUSION INIT: CPT

## 2025-02-28 PROCEDURE — 87636 SARSCOV2 & INF A&B AMP PRB: CPT

## 2025-02-28 PROCEDURE — G0378 HOSPITAL OBSERVATION PER HR: HCPCS

## 2025-02-28 PROCEDURE — 83540 ASSAY OF IRON: CPT

## 2025-02-28 PROCEDURE — 84484 ASSAY OF TROPONIN QUANT: CPT

## 2025-02-28 PROCEDURE — 84443 ASSAY THYROID STIM HORMONE: CPT

## 2025-02-28 PROCEDURE — 85025 COMPLETE CBC W/AUTO DIFF WBC: CPT

## 2025-02-28 PROCEDURE — 71275 CT ANGIOGRAPHY CHEST: CPT

## 2025-02-28 PROCEDURE — 93005 ELECTROCARDIOGRAM TRACING: CPT

## 2025-02-28 PROCEDURE — 0202U NFCT DS 22 TRGT SARS-COV-2: CPT

## 2025-02-28 PROCEDURE — 99222 1ST HOSP IP/OBS MODERATE 55: CPT | Performed by: INTERNAL MEDICINE

## 2025-02-28 RX ORDER — OMEPRAZOLE 20 MG/1
20 CAPSULE, DELAYED RELEASE ORAL DAILY
COMMUNITY

## 2025-02-28 RX ORDER — LEVOTHYROXINE SODIUM 100 UG/1
100 TABLET ORAL
Status: DISCONTINUED | OUTPATIENT
Start: 2025-03-01 | End: 2025-03-01 | Stop reason: HOSPADM

## 2025-02-28 RX ORDER — ONDANSETRON 4 MG/1
4 TABLET, ORALLY DISINTEGRATING ORAL EVERY 8 HOURS PRN
Status: DISCONTINUED | OUTPATIENT
Start: 2025-02-28 | End: 2025-03-01 | Stop reason: HOSPADM

## 2025-02-28 RX ORDER — LEVOTHYROXINE SODIUM 100 UG/1
150 TABLET ORAL
COMMUNITY

## 2025-02-28 RX ORDER — AZITHROMYCIN 250 MG/1
500 TABLET, FILM COATED ORAL ONCE
Status: COMPLETED | OUTPATIENT
Start: 2025-02-28 | End: 2025-02-28

## 2025-02-28 RX ORDER — POLYETHYLENE GLYCOL 3350 17 G/17G
17 POWDER, FOR SOLUTION ORAL DAILY PRN
Status: DISCONTINUED | OUTPATIENT
Start: 2025-02-28 | End: 2025-03-01 | Stop reason: HOSPADM

## 2025-02-28 RX ORDER — LEVOTHYROXINE SODIUM 50 UG/1
100 TABLET ORAL DAILY
Status: DISCONTINUED | OUTPATIENT
Start: 2025-02-28 | End: 2025-02-28

## 2025-02-28 RX ORDER — PANTOPRAZOLE SODIUM 40 MG/1
40 TABLET, DELAYED RELEASE ORAL
Status: DISCONTINUED | OUTPATIENT
Start: 2025-03-01 | End: 2025-03-01 | Stop reason: HOSPADM

## 2025-02-28 RX ORDER — SODIUM CHLORIDE 0.9 % (FLUSH) 0.9 %
5-40 SYRINGE (ML) INJECTION PRN
Status: DISCONTINUED | OUTPATIENT
Start: 2025-02-28 | End: 2025-03-01 | Stop reason: HOSPADM

## 2025-02-28 RX ORDER — ONDANSETRON 2 MG/ML
4 INJECTION INTRAMUSCULAR; INTRAVENOUS EVERY 6 HOURS PRN
Status: DISCONTINUED | OUTPATIENT
Start: 2025-02-28 | End: 2025-03-01 | Stop reason: HOSPADM

## 2025-02-28 RX ORDER — ACETAMINOPHEN 325 MG/1
650 TABLET ORAL EVERY 6 HOURS PRN
Status: DISCONTINUED | OUTPATIENT
Start: 2025-02-28 | End: 2025-03-01 | Stop reason: HOSPADM

## 2025-02-28 RX ORDER — ANASTROZOLE 1 MG/1
1 TABLET ORAL DAILY
Status: DISCONTINUED | OUTPATIENT
Start: 2025-02-28 | End: 2025-03-01 | Stop reason: HOSPADM

## 2025-02-28 RX ORDER — AZITHROMYCIN 250 MG/1
250 TABLET, FILM COATED ORAL DAILY
Status: DISCONTINUED | OUTPATIENT
Start: 2025-03-01 | End: 2025-03-01 | Stop reason: HOSPADM

## 2025-02-28 RX ORDER — FERROUS SULFATE 325(65) MG
325 TABLET ORAL
Status: DISCONTINUED | OUTPATIENT
Start: 2025-03-03 | End: 2025-03-01 | Stop reason: HOSPADM

## 2025-02-28 RX ORDER — LOSARTAN POTASSIUM 50 MG/1
50 TABLET ORAL DAILY
Status: DISCONTINUED | OUTPATIENT
Start: 2025-02-28 | End: 2025-03-01 | Stop reason: HOSPADM

## 2025-02-28 RX ORDER — ARFORMOTEROL TARTRATE 15 UG/2ML
15 SOLUTION RESPIRATORY (INHALATION)
Status: DISCONTINUED | OUTPATIENT
Start: 2025-02-28 | End: 2025-02-28

## 2025-02-28 RX ORDER — VITAMIN B COMPLEX
3000 TABLET ORAL DAILY
Status: DISCONTINUED | OUTPATIENT
Start: 2025-02-28 | End: 2025-03-01 | Stop reason: HOSPADM

## 2025-02-28 RX ORDER — PRAVASTATIN SODIUM 20 MG
40 TABLET ORAL DAILY
Status: DISCONTINUED | OUTPATIENT
Start: 2025-02-28 | End: 2025-03-01 | Stop reason: HOSPADM

## 2025-02-28 RX ORDER — IPRATROPIUM BROMIDE AND ALBUTEROL SULFATE 2.5; .5 MG/3ML; MG/3ML
1 SOLUTION RESPIRATORY (INHALATION)
Status: DISCONTINUED | OUTPATIENT
Start: 2025-02-28 | End: 2025-03-01 | Stop reason: HOSPADM

## 2025-02-28 RX ORDER — AZELASTINE 1 MG/ML
1 SPRAY, METERED NASAL 2 TIMES DAILY PRN
Status: DISCONTINUED | OUTPATIENT
Start: 2025-02-28 | End: 2025-02-28 | Stop reason: CLARIF

## 2025-02-28 RX ORDER — SODIUM CHLORIDE 0.9 % (FLUSH) 0.9 %
5-40 SYRINGE (ML) INJECTION EVERY 12 HOURS SCHEDULED
Status: DISCONTINUED | OUTPATIENT
Start: 2025-02-28 | End: 2025-03-01 | Stop reason: HOSPADM

## 2025-02-28 RX ORDER — FLUTICASONE PROPIONATE 50 MCG
1 SPRAY, SUSPENSION (ML) NASAL DAILY PRN
Status: DISCONTINUED | OUTPATIENT
Start: 2025-03-01 | End: 2025-03-01 | Stop reason: HOSPADM

## 2025-02-28 RX ORDER — ACETAMINOPHEN 650 MG/1
650 SUPPOSITORY RECTAL EVERY 6 HOURS PRN
Status: DISCONTINUED | OUTPATIENT
Start: 2025-02-28 | End: 2025-03-01 | Stop reason: HOSPADM

## 2025-02-28 RX ORDER — PREDNISONE 20 MG/1
40 TABLET ORAL DAILY
Status: DISCONTINUED | OUTPATIENT
Start: 2025-02-28 | End: 2025-03-01 | Stop reason: HOSPADM

## 2025-02-28 RX ORDER — IPRATROPIUM BROMIDE AND ALBUTEROL SULFATE 2.5; .5 MG/3ML; MG/3ML
1 SOLUTION RESPIRATORY (INHALATION) EVERY 4 HOURS PRN
Status: DISCONTINUED | OUTPATIENT
Start: 2025-02-28 | End: 2025-03-01 | Stop reason: HOSPADM

## 2025-02-28 RX ORDER — TRIAMCINOLONE ACETONIDE 1 MG/G
1 OINTMENT TOPICAL NIGHTLY
COMMUNITY

## 2025-02-28 RX ORDER — BUDESONIDE 0.5 MG/2ML
0.5 INHALANT ORAL
Status: DISCONTINUED | OUTPATIENT
Start: 2025-02-28 | End: 2025-03-01 | Stop reason: HOSPADM

## 2025-02-28 RX ORDER — SODIUM CHLORIDE 9 MG/ML
INJECTION, SOLUTION INTRAVENOUS PRN
Status: DISCONTINUED | OUTPATIENT
Start: 2025-02-28 | End: 2025-03-01 | Stop reason: HOSPADM

## 2025-02-28 RX ORDER — SODIUM CHLORIDE 9 MG/ML
INJECTION, SOLUTION INTRAVENOUS
Status: COMPLETED
Start: 2025-02-28 | End: 2025-02-28

## 2025-02-28 RX ORDER — CETIRIZINE HYDROCHLORIDE 10 MG/1
5 TABLET ORAL DAILY
Status: DISCONTINUED | OUTPATIENT
Start: 2025-03-01 | End: 2025-03-01 | Stop reason: HOSPADM

## 2025-02-28 RX ORDER — LEVOTHYROXINE SODIUM 150 UG/1
150 TABLET ORAL
Status: DISCONTINUED | OUTPATIENT
Start: 2025-03-02 | End: 2025-03-01 | Stop reason: HOSPADM

## 2025-02-28 RX ORDER — IOPAMIDOL 755 MG/ML
75 INJECTION, SOLUTION INTRAVASCULAR
Status: COMPLETED | OUTPATIENT
Start: 2025-02-28 | End: 2025-02-28

## 2025-02-28 RX ORDER — MUPIROCIN 20 MG/G
1 OINTMENT TOPICAL DAILY PRN
COMMUNITY

## 2025-02-28 RX ORDER — SODIUM CHLORIDE 9 MG/ML
INJECTION, SOLUTION INTRAVENOUS CONTINUOUS
Status: ACTIVE | OUTPATIENT
Start: 2025-02-28 | End: 2025-02-28

## 2025-02-28 RX ADMIN — ANASTROZOLE 1 MG: 1 TABLET ORAL at 20:43

## 2025-02-28 RX ADMIN — SODIUM CHLORIDE: 0.9 INJECTION, SOLUTION INTRAVENOUS at 19:01

## 2025-02-28 RX ADMIN — SODIUM CHLORIDE: 9 INJECTION, SOLUTION INTRAVENOUS at 19:01

## 2025-02-28 RX ADMIN — PRAVASTATIN SODIUM 40 MG: 20 TABLET ORAL at 18:54

## 2025-02-28 RX ADMIN — IOPAMIDOL 75 ML: 755 INJECTION, SOLUTION INTRAVENOUS at 14:09

## 2025-02-28 RX ADMIN — LOSARTAN POTASSIUM 50 MG: 50 TABLET, FILM COATED ORAL at 18:55

## 2025-02-28 RX ADMIN — AZITHROMYCIN DIHYDRATE 500 MG: 250 TABLET ORAL at 18:55

## 2025-02-28 RX ADMIN — Medication 3000 UNITS: at 18:55

## 2025-02-28 RX ADMIN — PREDNISONE 40 MG: 20 TABLET ORAL at 18:55

## 2025-02-28 ASSESSMENT — PAIN DESCRIPTION - DESCRIPTORS: DESCRIPTORS: OTHER (COMMENT)

## 2025-02-28 ASSESSMENT — PAIN - FUNCTIONAL ASSESSMENT: PAIN_FUNCTIONAL_ASSESSMENT: 0-10

## 2025-02-28 ASSESSMENT — PAIN DESCRIPTION - LOCATION: LOCATION: ABDOMEN

## 2025-02-28 ASSESSMENT — PAIN DESCRIPTION - PAIN TYPE: TYPE: ACUTE PAIN

## 2025-02-28 ASSESSMENT — LIFESTYLE VARIABLES: HOW MANY STANDARD DRINKS CONTAINING ALCOHOL DO YOU HAVE ON A TYPICAL DAY: PATIENT DOES NOT DRINK

## 2025-02-28 ASSESSMENT — PAIN DESCRIPTION - ORIENTATION: ORIENTATION: MID

## 2025-02-28 NOTE — ED PROVIDER NOTES
[facility] EMERGENCY DEPARTMENT  EMERGENCY DEPARTMENT ENCOUNTER        Pt Name: Nella White  MRN: [id number]  Birthdate 1950  Date of evaluation: 2/28/2025  Provider: Yvrose Ramos DO  PCP: Srinath Tolbert MD  Note Started: 11:52 AM EST 2/28/25    CHIEF COMPLAINT       Chief Complaint   Patient presents with    Cough     Short of breath x 1 week       HISTORY OF PRESENT ILLNESS: 1 or more Elements   History From: Patient     Limitations to history : None    Nella White is a 74 y.o. female with past medical history of breast cancer, hypothyroidism, hyperlipidemia, hypertension, pulmonary embolus who presents to the emergency department for shortness of breath.  The patient states that she became short of breath yesterday.  She states that she checked her pulse ox last night when she was feeling short of breath and noticed that it was 92%.  She states she then went to bed and when she woke up this morning, she had a Zoom meeting with her doctor in [place] and then she states she came out of the room and went to go talk to her  and became very short of breath after walking a minimal distance and talking.  She states she checked her pulse ox at that time which was 88%.  She denies any associated chest pain with the shortness of breath.  She states her shortness of breath is worse with exertion and talking, but better with rest.  She states she did have a previous pulmonary embolism back in 2018, and was on anticoagulation for 6 months, but is no longer on anticoagulation.  She denies any fevers or sick contacts.  She states she has had a chronic cough since October.    Patient denies fever, chills, headache, chest pain, abdominal pain, nausea, vomiting, diarrhea, lightheadedness, dysuria, hematuria, hematochezia, and melena.    Nursing Notes were all reviewed and agreed with or any disagreements were addressed in the HPI.        REVIEW OF EXTERNAL NOTES :        to the emergency department for shortness of breath and hypoxia on at home pulse ox.  The differential diagnosis includes, but is not limited to pulmonary embolism, pneumonia, viral illness.  The patient presents to the emergency department tachycardic with a pulse ox of 92%.  EKG was not concerning for ischemia and troponin was normal at less than 6.  COVID and influenza were not detected.  Patient's hemoglobin was stable at her baseline.  Patient did not have any significant electrolyte abnormalities.  CTA pulmonary did not show any evidence of pulmonary embolism, pneumonia or pleural effusion.  The patient was ambulated, and she did desaturate down to 85%.  The case, imaging, lab results, and physical exam findings were discussed with the hospitalist who accepted the patient for admission for hypoxia.  The patient is stable for admission.      CONSULTS: (Who and What was discussed)  None        FINAL IMPRESSION      1. Cough, unspecified type    2. Hypoxia          DISPOSITION/PLAN     DISPOSITION Admitted 02/28/2025 05:07:56 PM        2/28/25, 11:52 AM EST.    Yvrose Ramos DO PGY-1  Emergency Medicine    PATIENT REFERRED TO:  Srinath Tolbert MD  [address] [address]  [phone]            DISCHARGE MEDICATIONS:  New Prescriptions    No medications on file       DISCONTINUED MEDICATIONS:  Discontinued Medications    No medications on file              (Please note that portions of this note were completed with a voice recognition program.  Efforts were made to edit the dictations but occasionally words are mis-transcribed.)    Yvrose Ramos DO (electronically signed)

## 2025-02-28 NOTE — DISCHARGE INSTRUCTIONS
Your information:  Name: Nella White  : 1950    Your instructions:    Discharge Home    Follow up with your Primary care provider  Complete PFT      What to do after you leave the hospital:    Recommended diet: regular diet    Recommended activity: activity as tolerated        The following personal items were collected during your admission and were returned to you:    Belongings  Dental Appliances: None  Vision - Corrective Lenses: Eyeglasses  Hearing Aid: Bilateral hearing aids  Clothing: Footwear, Pants, Shirt  Jewelry: Watch  Body Piercings Removed: No  Electronic Devices: Cell Phone,   Weapons (Notify Protective Services/Security): None  Other Valuables: Sent home  Home Medications: None  Valuables Given To: Patient  Provide Name(s) of Who Valuable(s) Were Given To: self  Patient approves for provider to speak to responsible person post operatively: Yes    Information obtained by:  By signing below, I understand that if any problems occur once I leave the hospital I am to contact PCP.  I understand and acknowledge receipt of the instructions indicated above.

## 2025-02-28 NOTE — PROGRESS NOTES
Pt's base line 97% RA, HR 80, with ambulation pt dropped to 88-89% w/ a HR of 117, and at 1-2 min in she dropped to 85%.  Did experience SOB and difficulty \"towards the end\" per pt.  Frequent cough/clearing which she attributes to allergies and drainage. Increased with walk.

## 2025-02-28 NOTE — H&P
[facility] Hospitalist Group   History and Physical    CHIEF COMPLAINT:  Shortness of breath    History of Present Illness:  Nella White is a 74 y.o. female with a history of HTN, HLD, hypothyroidism, hx of breast cancer & PE  who presents with shortness of breath and subacute cough. Initially SOB yesterday, checked SPO2 which was in the mid 90's. ANTHONY earlier this morning, again checked SPO2 and states it was in mid 80's, prompting her to seek medical attention. Denies CP. Denies hematochezia, melena, hematuria, hemoptysis, or any other signs of bleeding.   VS stable at rest in ER, with ambulation she became hypoxic & tachycardic. Pulm CTA with no evidence of PE or acute cardiopulmonary process. Flu/covid negative. Hgb 9.2, has been intermittently low over the last few years but baseline ~10.5-12.0. Her parents smoked, she has had no other significant passive exposure to smoking.  While in ER, pt received no medications.     WORK UP SINCE ARRIVAL:  Results for orders placed or performed during the hospital encounter of 02/28/25   COVID-19 & Influenza Combo    Specimen: Nasopharyngeal Swab   Result Value Ref Range    Specimen Description .NASOPHARYNGEAL SWAB     Source .NASOPHARYNGEAL SWAB     SARS-CoV-2 RNA, RT PCR Not Detected Not Detected    Influenza A Not Detected Not Detected    Influenza B Not Detected Not Detected   CBC with Auto Differential   Result Value Ref Range    WBC 9.0 4.5 - 11.5 k/uL    RBC 3.28 (L) 3.50 - 5.50 m/uL    Hemoglobin 9.2 (L) 11.5 - 15.5 g/dL    Hematocrit 29.4 (L) 34.0 - 48.0 %    MCV 89.6 80.0 - 99.9 fL    MCH 28.0 26.0 - 35.0 pg    MCHC 31.3 (L) 32.0 - 34.5 g/dL    RDW 15.9 (H) 11.5 - 15.0 %    Platelets 523 (H) 130 - 450 k/uL    MPV 8.7 7.0 - 12.0 fL    Neutrophils % 66 43.0 - 80.0 %    Lymphocytes % 23 20.0 - 42.0 %    Monocytes % 8 2.0 - 12.0 %    Eosinophils % 1 0 - 6 %    Basophils % 1 0.0 - 2.0 %    Immature Granulocytes % 0 0.0 - 5.0 %    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 She is in the process of scheduling balloon endoscopy at [facility]  -initial Hgb 9.2, was 9.6 in Oct  -iron panel pending  -continue home iron supplementation  -start 500ml IVF infusion @100ml/hr   -PPI daily  -pt states she decided to stop asa when her Hgb started dropping, notes that her PCP is aware  -SCDs for DVT prophylaxis    Exertional hypoxia  -likely multifactorial 2/2 COPD exacerbation and anemia  -will need ambulating SPO2 prior to discharge    Hypothyroidism  -continue home levothyroxine    Thoracic AAA  -4cm  -consult placed to cardiothoracic surgery at [facility]    History of bilateral breast cancer  -continue home arimidex    Code Status: Full Code  DVT prophylaxis: SCDs    Disposition: From home. Newly admitted, but currently anticipate 2-3 days inpatient for work-up, treatment & monitoring to ensure safe discharge.    45 minutes or more spent in chart review, examination, evaluation, counseling and review of medications, discussing plan of care with collaborating physician, and documentation.    NOTE: This report was transcribed using voice recognition software. Every effort was made to ensure accuracy; however, inadvertent computerized transcription errors may be present.     Signed:  Electronically signed by KIERRA Dickinson NP on 2/28/2025 at 5:17 PM  [facility]- Hospitalist

## 2025-02-28 NOTE — PROGRESS NOTES
Pharmacy Note    This patient was ordered azelastine 0.1% nasal spray. Per the Pharmacy & Therapeutics Committee, this medication is non-formulary and not stocked by pharmacy for the reason indicated below. The medication can be reordered at discharge.     Medications in which risks outweigh benefits during hospitalization:           -  oral bisphosphonates         -  raloxifene (Evista)        -  SGLT2 inhibitors (ordered in the hospital for an indication other than heart failure or chronic kidney disease)    Medications that lack necessity during an acute hospital stay:        -  nasal antihistamines        -  nasal ipratropium 0.03% and 0.06%        -  nasal miacalcin        -  acyclovir topical cream/ointment orders for herpes labialis (cold sores)

## 2025-03-01 VITALS
BODY MASS INDEX: 27.84 KG/M2 | TEMPERATURE: 97.8 F | SYSTOLIC BLOOD PRESSURE: 125 MMHG | WEIGHT: 167.1 LBS | RESPIRATION RATE: 18 BRPM | DIASTOLIC BLOOD PRESSURE: 61 MMHG | HEIGHT: 65 IN | HEART RATE: 78 BPM | OXYGEN SATURATION: 93 %

## 2025-03-01 LAB
B PARAP IS1001 DNA NPH QL NAA+NON-PROBE: NOT DETECTED
B PERT DNA SPEC QL NAA+PROBE: NOT DETECTED
C PNEUM DNA NPH QL NAA+NON-PROBE: NOT DETECTED
FERRITIN SERPL-MCNC: 275 NG/ML
FLUAV RNA NPH QL NAA+NON-PROBE: NOT DETECTED
FLUBV RNA NPH QL NAA+NON-PROBE: NOT DETECTED
HADV DNA NPH QL NAA+NON-PROBE: NOT DETECTED
HCOV 229E RNA NPH QL NAA+NON-PROBE: NOT DETECTED
HCOV HKU1 RNA NPH QL NAA+NON-PROBE: NOT DETECTED
HCOV NL63 RNA NPH QL NAA+NON-PROBE: NOT DETECTED
HCOV OC43 RNA NPH QL NAA+NON-PROBE: NOT DETECTED
HMPV RNA NPH QL NAA+NON-PROBE: NOT DETECTED
HPIV1 RNA NPH QL NAA+NON-PROBE: NOT DETECTED
HPIV2 RNA NPH QL NAA+NON-PROBE: NOT DETECTED
HPIV3 RNA NPH QL NAA+NON-PROBE: NOT DETECTED
HPIV4 RNA NPH QL NAA+NON-PROBE: NOT DETECTED
IRON SATN MFR SERPL: 10 % (ref 15–50)
IRON SERPL-MCNC: 23 UG/DL (ref 37–145)
M PNEUMO DNA NPH QL NAA+NON-PROBE: NOT DETECTED
RSV RNA NPH QL NAA+NON-PROBE: NOT DETECTED
RV+EV RNA NPH QL NAA+NON-PROBE: NOT DETECTED
SARS-COV-2 RNA NPH QL NAA+NON-PROBE: NOT DETECTED
SPECIMEN DESCRIPTION: NORMAL
TIBC SERPL-MCNC: 234 UG/DL (ref 250–450)
TRANSFERRIN SERPL-MCNC: 177 MG/DL (ref 200–360)

## 2025-03-01 PROCEDURE — 94640 AIRWAY INHALATION TREATMENT: CPT

## 2025-03-01 PROCEDURE — 6370000000 HC RX 637 (ALT 250 FOR IP): Performed by: NURSE PRACTITIONER

## 2025-03-01 PROCEDURE — 99239 HOSP IP/OBS DSCHRG MGMT >30: CPT | Performed by: NURSE PRACTITIONER

## 2025-03-01 PROCEDURE — G0378 HOSPITAL OBSERVATION PER HR: HCPCS

## 2025-03-01 PROCEDURE — 2500000003 HC RX 250 WO HCPCS: Performed by: NURSE PRACTITIONER

## 2025-03-01 RX ORDER — PREDNISONE 20 MG/1
40 TABLET ORAL DAILY
Qty: 6 TABLET | Refills: 0 | Status: SHIPPED | OUTPATIENT
Start: 2025-03-02 | End: 2025-03-05

## 2025-03-01 RX ORDER — AZITHROMYCIN 250 MG/1
250 TABLET, FILM COATED ORAL DAILY
Qty: 3 TABLET | Refills: 0 | Status: SHIPPED | OUTPATIENT
Start: 2025-03-02 | End: 2025-03-05

## 2025-03-01 RX ADMIN — CETIRIZINE HYDROCHLORIDE 5 MG: 10 TABLET, FILM COATED ORAL at 09:18

## 2025-03-01 RX ADMIN — PANTOPRAZOLE SODIUM 40 MG: 40 TABLET, DELAYED RELEASE ORAL at 05:13

## 2025-03-01 RX ADMIN — LOSARTAN POTASSIUM 50 MG: 50 TABLET, FILM COATED ORAL at 09:17

## 2025-03-01 RX ADMIN — LEVOTHYROXINE SODIUM 100 MCG: 0.1 TABLET ORAL at 05:13

## 2025-03-01 RX ADMIN — IPRATROPIUM BROMIDE AND ALBUTEROL SULFATE 1 DOSE: .5; 2.5 SOLUTION RESPIRATORY (INHALATION) at 09:54

## 2025-03-01 RX ADMIN — Medication 3000 UNITS: at 09:17

## 2025-03-01 RX ADMIN — AZITHROMYCIN DIHYDRATE 250 MG: 250 TABLET ORAL at 09:17

## 2025-03-01 RX ADMIN — ANASTROZOLE 1 MG: 1 TABLET ORAL at 09:16

## 2025-03-01 RX ADMIN — PRAVASTATIN SODIUM 40 MG: 20 TABLET ORAL at 09:17

## 2025-03-01 RX ADMIN — PREDNISONE 40 MG: 20 TABLET ORAL at 09:18

## 2025-03-01 RX ADMIN — SODIUM CHLORIDE, PRESERVATIVE FREE 10 ML: 5 INJECTION INTRAVENOUS at 09:18

## 2025-03-01 ASSESSMENT — LIFESTYLE VARIABLES
HOW OFTEN DO YOU HAVE A DRINK CONTAINING ALCOHOL: NEVER
HOW MANY STANDARD DRINKS CONTAINING ALCOHOL DO YOU HAVE ON A TYPICAL DAY: PATIENT DOES NOT DRINK

## 2025-03-01 NOTE — PLAN OF CARE
Problem: Pain  Goal: Verbalizes/displays adequate comfort level or baseline comfort level  3/1/2025 1318 by Michelle Rivera, RN  Outcome: Progressing     Problem: Infection - Adult  Goal: Absence of infection at discharge  3/1/2025 1318 by Michelle Rivera, RN  Outcome: Progressing

## 2025-03-01 NOTE — PROGRESS NOTES
Pulse oximetry assessment   93% at rest on room air, HR 78  91% while ambulating 200ft on room air,    Pt states she is \"winded\" but not SOB  Return to edge of bed for recovery  95% at rest on room air,

## 2025-03-01 NOTE — PLAN OF CARE
Problem: Discharge Planning  Goal: Discharge to home or other facility with appropriate resources  Outcome: Progressing     Problem: Pain  Goal: Verbalizes/displays adequate comfort level or baseline comfort level  Outcome: Progressing     Problem: Infection - Adult  Goal: Absence of infection at discharge  Outcome: Progressing  Goal: Absence of infection during hospitalization  Outcome: Progressing  Goal: Absence of fever/infection during anticipated neutropenic period  Outcome: Progressing

## 2025-03-01 NOTE — DISCHARGE SUMMARY
ointment  Commonly known as: BACTROBAN     omeprazole 20 MG delayed release capsule  Commonly known as: PRILOSEC     pravastatin 40 MG tablet  Commonly known as: PRAVACHOL  Take 1 tablet by mouth daily     ProbioMax Daily DF Caps     therapeutic multivitamin-minerals tablet     triamcinolone 0.1 % ointment  Commonly known as: KENALOG     Vitamin C Plus 1000 MG Tabs     vitamin D 25 MCG (1000 UT) Caps     zinc 50 MG Caps           * This list has 2 medication(s) that are the same as other medications prescribed for you. Read the directions carefully, and ask your doctor or other care provider to review them with you.                   Where to Get Your Medications        These medications were sent to [facility] [address] [place] [address] ([place]), [place] - [address] [facility] - P [phone] - F [phone]  [facility] [place] [address]      Phone: [phone]   albuterol-ipratropium  MCG/ACT Aers inhaler  azithromycin 250 MG tablet  predniSONE 20 MG tablet         45 minutes or more spent in chart review, examination, evaluation, counseling and review of medications, discussing plan of care with collaborating physician, and documentation.    NOTE: This report was transcribed using voice recognition software. Every effort was made to ensure accuracy; however, inadvertent computerized transcription errors may be present.     Signed:  Electronically signed by KIERRA Dickinson NP on 3/1/2025 at 11:30 AM  [facility]- Hospitalist

## 2025-03-02 LAB
EKG ATRIAL RATE: 76 BPM
EKG P AXIS: 61 DEGREES
EKG P-R INTERVAL: 162 MS
EKG Q-T INTERVAL: 382 MS
EKG QRS DURATION: 78 MS
EKG QTC CALCULATION (BAZETT): 429 MS
EKG R AXIS: 81 DEGREES
EKG T AXIS: 83 DEGREES
EKG VENTRICULAR RATE: 76 BPM

## 2025-03-02 PROCEDURE — 93010 ELECTROCARDIOGRAM REPORT: CPT | Performed by: INTERNAL MEDICINE

## 2025-03-03 ENCOUNTER — CARE COORDINATION (OUTPATIENT)
Dept: CARE COORDINATION | Age: 75
End: 2025-03-03

## 2025-03-03 NOTE — CARE COORDINATION
Care Transitions Note    Initial Call - Call within 2 business days of discharge: Yes    Patient Current Location:  Home: [address] [place] [address] [place] [address]    Care Transition Nurse contacted the patient by telephone to perform post hospital discharge assessment, verified name and  as identifiers. Provided introduction to self, and explanation of the Care Transition Nurse role.     Patient: Nella White    Patient : 1950   MRN: [id number]    Reason for Admission: cough  Discharge Date: 3/1/25  RURS: No data recorded    Last Discharge Facility       Date Complaint Diagnosis Description Type Department Provider    25 Cough Cough, unspecified type ... ED to Hosp-Admission (Discharged) (ADMITTED) [facility] 3 SURG Jeramy Reid DO; [facility]            Was this an external facility discharge? No    Additional needs identified to be addressed with provider   No needs identified             Method of communication with provider: none.    Patients top risk factors for readmission: depression, medical condition-COPD, CAD, PE, HTN, immunocompromised, anxiety, panic, multiple health system providers, and polypharmacy    Interventions to address risk factors:   Education: COPD  Referrals: declined to [facility] pharmacy   Aware of need for PFT as per AVS.    Care Summary Note:   -Patient admitted to [facility] on 25 with symptoms of SOB and subacute cough.   See hospital discharge summary for further details.   -Patient reports she is \"doing ok,\" denies SOB, remains with cough mostly in am upon arising productive of clear mucus.  Patient admits to some occasional lightheadedness-feels it is d/t her anemia.  Patient in process of scheduling iron infusion with her [facility] provider.  Patient also mentioned she has to schedule with GI for cauterization and with CTS r/t her aneurysm.  -Does not check B/P.  Checked oximeter while on phone with CTN-93%.  At hospital discharge !@%/61, HR 79, oximeter 93%.  -Patient

## 2025-03-04 ENCOUNTER — PATIENT MESSAGE (OUTPATIENT)
Dept: FAMILY MEDICINE CLINIC | Age: 75
End: 2025-03-04

## 2025-03-04 ENCOUNTER — OFFICE VISIT (OUTPATIENT)
Dept: FAMILY MEDICINE CLINIC | Age: 75
End: 2025-03-04

## 2025-03-04 VITALS
HEART RATE: 76 BPM | HEIGHT: 65 IN | WEIGHT: 164 LBS | SYSTOLIC BLOOD PRESSURE: 114 MMHG | OXYGEN SATURATION: 96 % | TEMPERATURE: 97.5 F | DIASTOLIC BLOOD PRESSURE: 70 MMHG | BODY MASS INDEX: 27.32 KG/M2

## 2025-03-04 DIAGNOSIS — I71.21 ANEURYSM OF ASCENDING AORTA WITHOUT RUPTURE: ICD-10-CM

## 2025-03-04 DIAGNOSIS — E78.5 HYPERLIPIDEMIA, UNSPECIFIED HYPERLIPIDEMIA TYPE: ICD-10-CM

## 2025-03-04 DIAGNOSIS — R06.09 DYSPNEA ON EXERTION: ICD-10-CM

## 2025-03-04 DIAGNOSIS — J44.1 CHRONIC OBSTRUCTIVE PULMONARY DISEASE WITH ACUTE EXACERBATION (HCC): ICD-10-CM

## 2025-03-04 DIAGNOSIS — E03.9 HYPOTHYROIDISM, UNSPECIFIED TYPE: ICD-10-CM

## 2025-03-04 DIAGNOSIS — D50.0 IRON DEFICIENCY ANEMIA DUE TO CHRONIC BLOOD LOSS: ICD-10-CM

## 2025-03-04 DIAGNOSIS — Z09 HOSPITAL DISCHARGE FOLLOW-UP: Primary | ICD-10-CM

## 2025-03-04 DIAGNOSIS — I10 ESSENTIAL HYPERTENSION: ICD-10-CM

## 2025-03-04 NOTE — PROGRESS NOTES
Follow-up in about a month    An electronic signature was used to authenticate this note.  --Srinath Tolbert MD

## 2025-03-04 NOTE — ASSESSMENT & PLAN NOTE
Chronic, at goal (stable), continue current treatment plan    Levothyroxine 100 mcg daily x 5 days a week and 150 mcg 2 times a week

## 2025-03-05 ENCOUNTER — TELEPHONE (OUTPATIENT)
Dept: FAMILY MEDICINE CLINIC | Age: 75
End: 2025-03-05

## 2025-03-05 NOTE — TELEPHONE ENCOUNTER
I called patient to RS her appt on 4/16/25 due to Dr. Tolbert being out of the ofc and offered his next available appt which is 5/13/25.  Patient stated that she was seen yesterday as a HFU and that Dr. Tolbert wanted her to be seen in 1 month.  She informed me that she's fine with waiting, as long as Dr. Tolbert has no objection to her not coming back in sooner.  I informed her that I will send a msg.     Last seen 3/4/2025  Next appt 5/13/2025

## 2025-03-18 ENCOUNTER — CARE COORDINATION (OUTPATIENT)
Dept: CARE COORDINATION | Age: 75
End: 2025-03-18

## 2025-03-18 NOTE — CARE COORDINATION
Care Transitions Note    Follow Up Call     Patient Current Location:  Home: [address]  [place] [address]    Care Transition Nurse contacted the patient by telephone.     Additional needs identified to be addressed with provider   No needs identified                 Method of communication with provider: none.    Care Summary Note:   -Patient reporting no changes.  Denies SOB. Oximeter reading well, did not provide any readings.  States the morning cough exhausts her as well as her anemia.  Hoping the iron infusion will help next week.  States has constant drainage in the back of the throat which can cause gagging and she tries to spit it up to avoid a cough occurring later.  Has to take frequent rest periods. CTN provided active listening/support to patient.  -Patient denies any needs at this time.  -CTN will plan for final call next outreach.    Plan of care updates since last contact:  Completed PCP TCM/HFU on 3/4/25-no new orders/medications.  B/P 114/70, HR 76, oximeter 96% at visit.  Iron infusion at [facility] on 3/24/25 per patient.  Hospitals in [place] has F/U's with [facility] providers scheduled-GI on 5/8/25 and cardiology in August 2025 for further testing r/t her aneurysm.         Advance Care Planning:   Does patient have an Advance Directive: health care decision maker confirmed on a prior call.    Medication Review:  No changes since last call.     Remote Patient Monitoring:  Offered patient enrollment in the Remote Patient Monitoring (RPM) program for in-home monitoring: Addressed on a prior call.    Assessments:  Care Transitions Subsequent and Final Call    Subsequent and Final Calls  Have your medications changed?: No  Do you have any questions related to your medications?: No  Do you currently have any active services?: No  Do you have any needs or concerns that I can assist you with?: No  Identified Barriers: Stress  Care Transitions Interventions    Pharmacist: Declined    Other Interventions:

## 2025-03-26 ENCOUNTER — HOSPITAL ENCOUNTER (OUTPATIENT)
Dept: PULMONOLOGY | Age: 75
Discharge: HOME | End: 2025-03-26
Attending: INTERNAL MEDICINE

## 2025-03-26 DIAGNOSIS — J44.9 COPD SUGGESTED BY INITIAL EVALUATION (HCC): ICD-10-CM

## 2025-04-01 ENCOUNTER — CARE COORDINATION (OUTPATIENT)
Dept: CARE COORDINATION | Age: 75
End: 2025-04-01

## 2025-04-01 NOTE — CARE COORDINATION
Care Transitions Note    Final Call     Patient Current Location:  Home: [address]  [place] [address]    Care Transition Nurse contacted the patient by telephone. Verified name and  as identifiers.    Patient graduated from the Care Transitions program on 25.  Patient/family has the ability to self-manage at this time.    Advance Care Planning:   Does patient have an Advance Directive: health care decision maker confirmed on a prior call.    Handoff:   Patient was not referred to the ACM team due to no additional needs identified.       Care Summary Note:   -Patient feeling the iron infusion she had on 3/24/25 at [facility] helped her greatly.  -Completed PFT's on 3/26/25.  -Patient denies any needs at this time.  -CTN to sign off as care transition program ends tomorrow.     Assessments:  Care Transitions Subsequent and Final Call    Subsequent and Final Calls  Have your medications changed?: No  Do you have any questions related to your medications?: No  Do you currently have any active services?: No  Do you have any needs or concerns that I can assist you with?: No  Identified Barriers: None  Care Transitions Interventions    Pharmacist: Declined    Other Interventions:              Upcoming Appointments:    Future Appointments         Provider Specialty Dept Phone    2025 9:45 AM Srinath Tolbert MD Family Medicine [phone]    2025 11:00 AM Meche Leach MD Pulmonology [phone]    2025 10:00 AM Srinath Tolbert MD Family Medicine [phone]            Patient has agreed to contact primary care provider and/or specialist for any further questions, concerns, or needs.    Edel Denney RN

## 2025-04-21 ENCOUNTER — OFFICE VISIT (OUTPATIENT)
Dept: FAMILY MEDICINE CLINIC | Age: 75
End: 2025-04-21
Payer: MEDICARE

## 2025-04-21 VITALS
TEMPERATURE: 98 F | HEART RATE: 77 BPM | DIASTOLIC BLOOD PRESSURE: 68 MMHG | OXYGEN SATURATION: 95 % | BODY MASS INDEX: 26.37 KG/M2 | SYSTOLIC BLOOD PRESSURE: 122 MMHG | WEIGHT: 158.3 LBS | HEIGHT: 65 IN

## 2025-04-21 DIAGNOSIS — E78.5 HYPERLIPIDEMIA, UNSPECIFIED HYPERLIPIDEMIA TYPE: ICD-10-CM

## 2025-04-21 DIAGNOSIS — E03.9 HYPOTHYROIDISM, UNSPECIFIED TYPE: ICD-10-CM

## 2025-04-21 DIAGNOSIS — D50.0 IRON DEFICIENCY ANEMIA DUE TO CHRONIC BLOOD LOSS: ICD-10-CM

## 2025-04-21 DIAGNOSIS — I10 ESSENTIAL HYPERTENSION: Primary | ICD-10-CM

## 2025-04-21 DIAGNOSIS — I71.21 ANEURYSM OF ASCENDING AORTA WITHOUT RUPTURE: ICD-10-CM

## 2025-04-21 DIAGNOSIS — J44.1 CHRONIC OBSTRUCTIVE PULMONARY DISEASE WITH ACUTE EXACERBATION (HCC): ICD-10-CM

## 2025-04-21 PROCEDURE — 3017F COLORECTAL CA SCREEN DOC REV: CPT | Performed by: INTERNAL MEDICINE

## 2025-04-21 PROCEDURE — 1159F MED LIST DOCD IN RCRD: CPT | Performed by: INTERNAL MEDICINE

## 2025-04-21 PROCEDURE — 3023F SPIROM DOC REV: CPT | Performed by: INTERNAL MEDICINE

## 2025-04-21 PROCEDURE — 1036F TOBACCO NON-USER: CPT | Performed by: INTERNAL MEDICINE

## 2025-04-21 PROCEDURE — 1090F PRES/ABSN URINE INCON ASSESS: CPT | Performed by: INTERNAL MEDICINE

## 2025-04-21 PROCEDURE — G8427 DOCREV CUR MEDS BY ELIG CLIN: HCPCS | Performed by: INTERNAL MEDICINE

## 2025-04-21 PROCEDURE — 1123F ACP DISCUSS/DSCN MKR DOCD: CPT | Performed by: INTERNAL MEDICINE

## 2025-04-21 PROCEDURE — 3078F DIAST BP <80 MM HG: CPT | Performed by: INTERNAL MEDICINE

## 2025-04-21 PROCEDURE — G8417 CALC BMI ABV UP PARAM F/U: HCPCS | Performed by: INTERNAL MEDICINE

## 2025-04-21 PROCEDURE — G8399 PT W/DXA RESULTS DOCUMENT: HCPCS | Performed by: INTERNAL MEDICINE

## 2025-04-21 PROCEDURE — 99214 OFFICE O/P EST MOD 30 MIN: CPT | Performed by: INTERNAL MEDICINE

## 2025-04-21 PROCEDURE — 3074F SYST BP LT 130 MM HG: CPT | Performed by: INTERNAL MEDICINE

## 2025-04-21 RX ORDER — MOMETASONE FUROATE AND FORMOTEROL FUMARATE DIHYDRATE 200; 5 UG/1; UG/1
2 AEROSOL RESPIRATORY (INHALATION)
COMMUNITY
Start: 2025-03-20

## 2025-04-21 RX ORDER — LOSARTAN POTASSIUM 50 MG/1
50 TABLET ORAL DAILY
Qty: 90 TABLET | Refills: 1 | Status: SHIPPED | OUTPATIENT
Start: 2025-04-21

## 2025-04-21 RX ORDER — PRAVASTATIN SODIUM 40 MG
40 TABLET ORAL DAILY
Qty: 90 TABLET | Refills: 1 | Status: SHIPPED | OUTPATIENT
Start: 2025-04-21

## 2025-04-21 RX ORDER — MONTELUKAST SODIUM 10 MG/1
10 TABLET ORAL NIGHTLY
COMMUNITY

## 2025-04-21 ASSESSMENT — ENCOUNTER SYMPTOMS
EYE DISCHARGE: 0
EYE PAIN: 0
ABDOMINAL PAIN: 0
SORE THROAT: 0
SHORTNESS OF BREATH: 0
BLOOD IN STOOL: 0
SINUS PAIN: 0
NAUSEA: 0

## 2025-04-21 NOTE — PROGRESS NOTES
Palpations: Abdomen is soft.   Musculoskeletal:         General: Normal range of motion.   Skin:     General: Skin is warm and dry.   Neurological:      Mental Status: She is alert and oriented to person, place, and time.   Psychiatric:         Behavior: Behavior normal.          Labs :    Lab Results   Component Value Date    WBC 9.0 02/28/2025    HGB 9.2 (L) 02/28/2025    HCT 29.4 (L) 02/28/2025     (H) 02/28/2025    CHOL 178 07/26/2021    TRIG 60 07/26/2021    HDL 55 08/19/2024    ALT 7 02/28/2025    AST 13 02/28/2025     02/28/2025    K 4.0 02/28/2025     02/28/2025    CREATININE 0.5 02/28/2025    BUN 9 02/28/2025    CO2 26 02/28/2025    TSH 0.42 02/28/2025    INR 1.1 02/23/2011    GLUF 89 08/19/2024    LABA1C 5.3 07/26/2021     Lab Results   Component Value Date/Time    COLORU Yellow 08/19/2024 08:19 AM    NITRU NEGATIVE 08/19/2024 08:19 AM    GLUCOSEU NEGATIVE 08/19/2024 08:19 AM    KETUA NEGATIVE 08/19/2024 08:19 AM    UROBILINOGEN 0.2 08/19/2024 08:19 AM    BILIRUBINUR NEGATIVE 08/19/2024 08:19 AM           ASSESSMENT     Patient Active Problem List    Diagnosis Date Noted    History of left breast cancer 10/06/2022     Priority: Medium     Overview Note:      S/p Lumpectomy, XRT abd Chemo --[facility] Oct 2021          History of right breast cancer- 2013  10/06/2022     Priority: Medium    Iron deficiency anemia due to chronic blood loss 04/21/2025    Aneurysm of ascending aorta without rupture 04/21/2025    COPD suggested by initial evaluation (HCC) 02/28/2025    Hypoxia 02/28/2025    Establishing care with new doctor, encounter for 03/28/2022    Hypothyroidism 07/30/2021    Hyperlipidemia 07/30/2021    Essential hypertension 07/30/2021    Depression with anxiety 07/30/2021    History of pulmonary embolism 07/30/2021        Diagnosis:   1. Essential hypertension  -     losartan (COZAAR) 50 MG tablet; Take 1 tablet by mouth daily, Disp-90 tablet, R-1Normal  -     Lipid, Fasting; Future  -

## 2025-06-18 RX ORDER — INHALER,ASSIST DEV,SMALL MASK
1 SPACER (EA) MISCELLANEOUS DAILY
COMMUNITY
Start: 2025-03-25

## 2025-06-20 ENCOUNTER — OFFICE VISIT (OUTPATIENT)
Dept: PULMONOLOGY | Age: 75
End: 2025-06-20
Payer: MEDICARE

## 2025-06-20 VITALS
OXYGEN SATURATION: 96 % | TEMPERATURE: 98.2 F | WEIGHT: 156 LBS | DIASTOLIC BLOOD PRESSURE: 68 MMHG | HEIGHT: 65 IN | BODY MASS INDEX: 25.99 KG/M2 | SYSTOLIC BLOOD PRESSURE: 124 MMHG | HEART RATE: 76 BPM

## 2025-06-20 DIAGNOSIS — M41.30 THORACOGENIC SCOLIOSIS, UNSPECIFIED SPINAL REGION: ICD-10-CM

## 2025-06-20 DIAGNOSIS — J44.9 CHRONIC OBSTRUCTIVE PULMONARY DISEASE, UNSPECIFIED COPD TYPE (HCC): Primary | ICD-10-CM

## 2025-06-20 PROCEDURE — 3078F DIAST BP <80 MM HG: CPT | Performed by: INTERNAL MEDICINE

## 2025-06-20 PROCEDURE — G8399 PT W/DXA RESULTS DOCUMENT: HCPCS | Performed by: INTERNAL MEDICINE

## 2025-06-20 PROCEDURE — G8417 CALC BMI ABV UP PARAM F/U: HCPCS | Performed by: INTERNAL MEDICINE

## 2025-06-20 PROCEDURE — 3074F SYST BP LT 130 MM HG: CPT | Performed by: INTERNAL MEDICINE

## 2025-06-20 PROCEDURE — 1159F MED LIST DOCD IN RCRD: CPT | Performed by: INTERNAL MEDICINE

## 2025-06-20 PROCEDURE — 1090F PRES/ABSN URINE INCON ASSESS: CPT | Performed by: INTERNAL MEDICINE

## 2025-06-20 PROCEDURE — 3017F COLORECTAL CA SCREEN DOC REV: CPT | Performed by: INTERNAL MEDICINE

## 2025-06-20 PROCEDURE — 3023F SPIROM DOC REV: CPT | Performed by: INTERNAL MEDICINE

## 2025-06-20 PROCEDURE — 1036F TOBACCO NON-USER: CPT | Performed by: INTERNAL MEDICINE

## 2025-06-20 PROCEDURE — 1123F ACP DISCUSS/DSCN MKR DOCD: CPT | Performed by: INTERNAL MEDICINE

## 2025-06-20 PROCEDURE — G8427 DOCREV CUR MEDS BY ELIG CLIN: HCPCS | Performed by: INTERNAL MEDICINE

## 2025-06-20 PROCEDURE — 99204 OFFICE O/P NEW MOD 45 MIN: CPT | Performed by: INTERNAL MEDICINE

## 2025-06-20 RX ORDER — BUDESONIDE AND FORMOTEROL FUMARATE DIHYDRATE 160; 4.5 UG/1; UG/1
2 AEROSOL RESPIRATORY (INHALATION) 2 TIMES DAILY
COMMUNITY
Start: 2025-05-21

## 2025-06-20 RX ORDER — ALBUTEROL SULFATE 90 UG/1
2 INHALANT RESPIRATORY (INHALATION) EVERY 6 HOURS PRN
Qty: 1 EACH | Refills: 5 | Status: SHIPPED | OUTPATIENT
Start: 2025-06-20

## 2025-06-20 ASSESSMENT — ENCOUNTER SYMPTOMS
EYES NEGATIVE: 1
SHORTNESS OF BREATH: 1

## 2025-06-20 NOTE — PROGRESS NOTES
Patient to follow up with physician in 6 months. Sample of Spiriva was given to the patient with instructions.

## 2025-06-20 NOTE — PROGRESS NOTES
Progress Note    Nella White  1950    CC:COPD       HPI: 74 year old female is sob on walking a flight of stairs, she was hospitalized in March 2025, was hypoxemic and told to have COPD. No significant smoking history but history of second hand smoking from her parents. She has been using Combivent and Symbicort inhalers. PFT showed stage II COPD. She had chest CTA, showed no PE. Sh had bilateral lumpectomy for CA breast followed by RT and chemotherapy. History of HTN, GERD, Hypothyroidism. She is seeing an Allergist.     Past Medical History:   Diagnosis Date    Anxiety     Breast cancer (HCC) 03/01/2013    right side breast cancer     Breast cancer (HCC) 09/01/2021    left side breast cancer     Breast cancer (HCC)     Right DCIS 2013, left IDC 2020    CAD (coronary artery disease)     Congenital hearing loss     Depression     seeing Dr. Bennett. doing better     GERD (gastroesophageal reflux disease)     Hyperlipidemia     Hypertension     Hypothyroidism     Osteopenia     Rosacea     Thyroid disease       Past Surgical History:   Procedure Laterality Date    BREAST SURGERY  2013    right and left lumpectomy    CARDIAC SURGERY      CHOLECYSTECTOMY  2017    COLONOSCOPY      ENDOSCOPY, COLON, DIAGNOSTIC      FRACTURE SURGERY Left     wrist fracture     TONSILLECTOMY      TUBAL LIGATION  1982    UPPER GASTROINTESTINAL ENDOSCOPY  2019      Family History   Problem Relation Age of Onset    Colon Cancer Mother     Alcohol Abuse Mother     Cancer Mother     Colon Cancer Sister     Cancer Sister     Diabetes Sister     Cancer Father 89        GI cancer, lip cancer     Alcohol Abuse Father     Prostate Cancer Father     Colon Cancer Brother     Hearing Loss Maternal Grandfather     Birth Defects Brother     Cancer Brother     Hearing Loss Brother       Social History     Socioeconomic History    Marital status:      Spouse name: None    Number of children: None    Years of education: None    Highest

## 2025-06-27 DIAGNOSIS — E78.5 HYPERLIPIDEMIA, UNSPECIFIED HYPERLIPIDEMIA TYPE: ICD-10-CM

## 2025-06-27 DIAGNOSIS — R09.82 POST-NASAL DRIP: ICD-10-CM

## 2025-06-27 RX ORDER — PRAVASTATIN SODIUM 40 MG
40 TABLET ORAL DAILY
Qty: 90 TABLET | Refills: 1 | Status: CANCELLED | OUTPATIENT
Start: 2025-06-27

## 2025-06-30 RX ORDER — AZELASTINE 1 MG/ML
1 SPRAY, METERED NASAL 2 TIMES DAILY PRN
Qty: 3 EACH | Refills: 0 | Status: SHIPPED | OUTPATIENT
Start: 2025-06-30

## 2025-06-30 NOTE — TELEPHONE ENCOUNTER
Name of Medication(s) Requested:  Requested Prescriptions     Pending Prescriptions Disp Refills    azelastine (ASTELIN) 0.1 % nasal spray 3 each 0     Si spray by Nasal route 2 times daily as needed for Rhinitis       Medication is on current medication list Yes    Dosage and directions were verified? Yes    Quantity verified: 90 day supply     Pharmacy Verified?  Yes    Last Appointment:  2025    Future appts:  Future Appointments   Date Time Provider Department Center   2025  9:30 AM Srinath Tolbert MD [place] [facility] DEP   2025 10:00 AM Srinath Tolbert MD [place] [facility]   2025 11:00 AM Meche Leach MD [place] [facility]        (If no appt send self scheduling link. .REFILLAPPT)  Scheduling request sent?     [] Yes  [x] No    Does patient need updated?  [] Yes  [x] No

## 2025-07-08 DIAGNOSIS — R09.82 POST-NASAL DRIP: ICD-10-CM

## 2025-07-09 RX ORDER — AZELASTINE 1 MG/ML
1 SPRAY, METERED NASAL 2 TIMES DAILY PRN
Qty: 3 EACH | Refills: 0 | OUTPATIENT
Start: 2025-07-09

## 2025-07-23 ENCOUNTER — OFFICE VISIT (OUTPATIENT)
Dept: FAMILY MEDICINE CLINIC | Age: 75
End: 2025-07-23

## 2025-07-23 VITALS
HEIGHT: 65 IN | BODY MASS INDEX: 25.43 KG/M2 | HEART RATE: 93 BPM | SYSTOLIC BLOOD PRESSURE: 120 MMHG | WEIGHT: 152.6 LBS | DIASTOLIC BLOOD PRESSURE: 70 MMHG | OXYGEN SATURATION: 96 % | TEMPERATURE: 98.2 F

## 2025-07-23 DIAGNOSIS — R09.82 POST-NASAL DRIP: ICD-10-CM

## 2025-07-23 DIAGNOSIS — D50.0 IRON DEFICIENCY ANEMIA DUE TO CHRONIC BLOOD LOSS: ICD-10-CM

## 2025-07-23 DIAGNOSIS — I10 ESSENTIAL HYPERTENSION: ICD-10-CM

## 2025-07-23 DIAGNOSIS — E78.5 HYPERLIPIDEMIA, UNSPECIFIED HYPERLIPIDEMIA TYPE: ICD-10-CM

## 2025-07-23 DIAGNOSIS — E03.9 HYPOTHYROIDISM, UNSPECIFIED TYPE: Primary | ICD-10-CM

## 2025-07-23 DIAGNOSIS — I71.21 ANEURYSM OF ASCENDING AORTA WITHOUT RUPTURE: ICD-10-CM

## 2025-07-23 DIAGNOSIS — J44.1 CHRONIC OBSTRUCTIVE PULMONARY DISEASE WITH ACUTE EXACERBATION (HCC): ICD-10-CM

## 2025-07-23 ASSESSMENT — ENCOUNTER SYMPTOMS
SHORTNESS OF BREATH: 0
BLOOD IN STOOL: 0
SORE THROAT: 0
ABDOMINAL PAIN: 0
EYE PAIN: 0
NAUSEA: 0
EYE DISCHARGE: 0
SINUS PAIN: 0

## 2025-07-23 NOTE — PROGRESS NOTES
Chief Complaint   Patient presents with    Sore     C/o Sore on Abdomen on incision site x 1 month. Tender to touch     Hypothyroidism     3 month follow up on hypothyroidism, would like to discuss her recent change in medication       Health Maintenance     Mammogram - scheduled        HPI:  Patient is here for follow-up     Overall patient is feeling well.  Patient had  laparoscopic surgery around 2017 for umbilical hernia.  Patient is noticing a small bump which is a hard right below the previous surgical scar.  Minimal pain on palpation  No discharge noted    Patient is scheduled to see her GI in August.  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  Received IV iron.     Allergy and Medications are reviewed and updated.  Past Medical History, Surgical History, and Family History has been reviewed and updated.    Review of Systems:  Review of Systems   Constitutional:  Negative for chills and fever.   HENT:  Negative for congestion, sinus pain and sore throat.    Eyes:  Negative for pain and discharge.   Respiratory:  Negative for shortness of breath (No new SOb).    Cardiovascular:  Negative for chest pain.   Gastrointestinal:  Negative for abdominal pain, blood in stool and nausea.   Genitourinary:  Negative for flank pain and frequency.   Musculoskeletal:  Negative for neck pain.   Hematological:  Does not bruise/bleed easily.   Psychiatric/Behavioral:  Negative for suicidal ideas.          Vitals:    07/23/25 0931   BP: 120/70   BP Site: Left Upper Arm   Patient Position: Sitting   Pulse: 93   Temp: 98.2 °F (36.8 °C)   TempSrc: Temporal   SpO2: 96%   Weight: 69.2 kg (152 lb 9.6 oz)   Height: 1.651 m (5' 5\")       Physical Exam  Vitals reviewed.   Constitutional:       Appearance: She is well-developed.   HENT:      Head: Normocephalic and atraumatic.      Nose: Nose normal.   Eyes:      Conjunctiva/sclera: Conjunctivae normal.      Pupils: Pupils are equal, round, and reactive to light.   Neck:

## 2025-07-23 NOTE — ASSESSMENT & PLAN NOTE
Chronic, at goal (stable), changes made today: -    Levothyroxine 100 mcg daily and take 50 mcg additionally once a week    Recheck TSH in about 8 weeks

## 2025-07-23 NOTE — ASSESSMENT & PLAN NOTE
Chronic, at goal (stable), continue current treatment plan    Patient is following with pulmonary.  Patient is on Symbicort 2 inhalers twice a day Spiriva Respimat asked and albuterol inhaler  as needed